# Patient Record
Sex: MALE | Race: OTHER | HISPANIC OR LATINO | Employment: UNEMPLOYED | URBAN - METROPOLITAN AREA
[De-identification: names, ages, dates, MRNs, and addresses within clinical notes are randomized per-mention and may not be internally consistent; named-entity substitution may affect disease eponyms.]

---

## 2023-01-04 ENCOUNTER — HOSPITAL ENCOUNTER (EMERGENCY)
Facility: HOSPITAL | Age: 38
Discharge: NURSING, REHAB OR LONG-TERM CARE | End: 2023-01-06
Attending: EMERGENCY MEDICINE

## 2023-01-04 DIAGNOSIS — K31.89 GASTRIC MASS: Primary | ICD-10-CM

## 2023-01-04 DIAGNOSIS — C78.7 LIVER METASTASIS (HCC): ICD-10-CM

## 2023-01-04 DIAGNOSIS — D64.9 ANEMIA: ICD-10-CM

## 2023-01-04 LAB
ALBUMIN SERPL BCP-MCNC: 3.5 G/DL (ref 3.5–5)
ALP SERPL-CCNC: 496 U/L (ref 34–104)
ALT SERPL W P-5'-P-CCNC: 52 U/L (ref 7–52)
ANION GAP SERPL CALCULATED.3IONS-SCNC: 8 MMOL/L (ref 4–13)
AST SERPL W P-5'-P-CCNC: 58 U/L (ref 13–39)
BASOPHILS # BLD AUTO: 0.05 THOUSANDS/ÂΜL (ref 0–0.1)
BASOPHILS NFR BLD AUTO: 1 % (ref 0–1)
BILIRUB SERPL-MCNC: 0.33 MG/DL (ref 0.2–1)
BILIRUB UR QL STRIP: NEGATIVE
BUN SERPL-MCNC: 15 MG/DL (ref 5–25)
CALCIUM SERPL-MCNC: 8.8 MG/DL (ref 8.4–10.2)
CHLORIDE SERPL-SCNC: 100 MMOL/L (ref 96–108)
CLARITY UR: CLEAR
CO2 SERPL-SCNC: 27 MMOL/L (ref 21–32)
COLOR UR: YELLOW
CREAT SERPL-MCNC: 0.76 MG/DL (ref 0.6–1.3)
EOSINOPHIL # BLD AUTO: 0.23 THOUSAND/ÂΜL (ref 0–0.61)
EOSINOPHIL NFR BLD AUTO: 2 % (ref 0–6)
ERYTHROCYTE [DISTWIDTH] IN BLOOD BY AUTOMATED COUNT: 14.1 % (ref 11.6–15.1)
GFR SERPL CREATININE-BSD FRML MDRD: 114 ML/MIN/1.73SQ M
GLUCOSE SERPL-MCNC: 102 MG/DL (ref 65–140)
GLUCOSE UR STRIP-MCNC: NEGATIVE MG/DL
HCT VFR BLD AUTO: 28 % (ref 36.5–49.3)
HGB BLD-MCNC: 9 G/DL (ref 12–17)
HGB UR QL STRIP.AUTO: NEGATIVE
IMM GRANULOCYTES # BLD AUTO: 0.02 THOUSAND/UL (ref 0–0.2)
IMM GRANULOCYTES NFR BLD AUTO: 0 % (ref 0–2)
KETONES UR STRIP-MCNC: NEGATIVE MG/DL
LEUKOCYTE ESTERASE UR QL STRIP: NEGATIVE
LIPASE SERPL-CCNC: 34 U/L (ref 11–82)
LYMPHOCYTES # BLD AUTO: 2.17 THOUSANDS/ÂΜL (ref 0.6–4.47)
LYMPHOCYTES NFR BLD AUTO: 22 % (ref 14–44)
MCH RBC QN AUTO: 23.6 PG (ref 26.8–34.3)
MCHC RBC AUTO-ENTMCNC: 32.1 G/DL (ref 31.4–37.4)
MCV RBC AUTO: 74 FL (ref 82–98)
MONOCYTES # BLD AUTO: 0.93 THOUSAND/ÂΜL (ref 0.17–1.22)
MONOCYTES NFR BLD AUTO: 9 % (ref 4–12)
NEUTROPHILS # BLD AUTO: 6.51 THOUSANDS/ÂΜL (ref 1.85–7.62)
NEUTS SEG NFR BLD AUTO: 66 % (ref 43–75)
NITRITE UR QL STRIP: NEGATIVE
NRBC BLD AUTO-RTO: 0 /100 WBCS
PH UR STRIP.AUTO: 6 [PH]
PLATELET # BLD AUTO: 490 THOUSANDS/UL (ref 149–390)
PMV BLD AUTO: 9.2 FL (ref 8.9–12.7)
POTASSIUM SERPL-SCNC: 4.3 MMOL/L (ref 3.5–5.3)
PROT SERPL-MCNC: 7.9 G/DL (ref 6.4–8.4)
PROT UR STRIP-MCNC: NEGATIVE MG/DL
RBC # BLD AUTO: 3.81 MILLION/UL (ref 3.88–5.62)
SODIUM SERPL-SCNC: 135 MMOL/L (ref 135–147)
SP GR UR STRIP.AUTO: 1.02 (ref 1–1.03)
UROBILINOGEN UR QL STRIP.AUTO: 1 E.U./DL
WBC # BLD AUTO: 9.91 THOUSAND/UL (ref 4.31–10.16)

## 2023-01-05 ENCOUNTER — APPOINTMENT (EMERGENCY)
Dept: CT IMAGING | Facility: HOSPITAL | Age: 38
End: 2023-01-05

## 2023-01-05 PROBLEM — C79.9 METASTATIC DISEASE (HCC): Status: ACTIVE | Noted: 2023-01-05

## 2023-01-05 PROBLEM — D64.9 ANEMIA: Status: ACTIVE | Noted: 2023-01-05

## 2023-01-05 PROBLEM — K31.89 GASTRIC MASS: Status: ACTIVE | Noted: 2023-01-05

## 2023-01-05 LAB — HEMOCCULT STL QL IA: NEGATIVE

## 2023-01-05 RX ORDER — ONDANSETRON 2 MG/ML
4 INJECTION INTRAMUSCULAR; INTRAVENOUS ONCE
Status: COMPLETED | OUTPATIENT
Start: 2023-01-05 | End: 2023-01-05

## 2023-01-05 RX ORDER — TRAMADOL HYDROCHLORIDE 50 MG/1
50 TABLET ORAL EVERY 6 HOURS PRN
Status: DISCONTINUED | OUTPATIENT
Start: 2023-01-05 | End: 2023-01-06 | Stop reason: HOSPADM

## 2023-01-05 RX ORDER — MORPHINE SULFATE 4 MG/ML
4 INJECTION, SOLUTION INTRAMUSCULAR; INTRAVENOUS ONCE
Status: COMPLETED | OUTPATIENT
Start: 2023-01-05 | End: 2023-01-05

## 2023-01-05 RX ORDER — ACETAMINOPHEN 325 MG/1
975 TABLET ORAL EVERY 6 HOURS PRN
Status: DISCONTINUED | OUTPATIENT
Start: 2023-01-05 | End: 2023-01-06 | Stop reason: HOSPADM

## 2023-01-05 RX ORDER — SODIUM CHLORIDE, SODIUM GLUCONATE, SODIUM ACETATE, POTASSIUM CHLORIDE, MAGNESIUM CHLORIDE, SODIUM PHOSPHATE, DIBASIC, AND POTASSIUM PHOSPHATE .53; .5; .37; .037; .03; .012; .00082 G/100ML; G/100ML; G/100ML; G/100ML; G/100ML; G/100ML; G/100ML
100 INJECTION, SOLUTION INTRAVENOUS CONTINUOUS
Status: DISCONTINUED | OUTPATIENT
Start: 2023-01-05 | End: 2023-01-06 | Stop reason: HOSPADM

## 2023-01-05 RX ORDER — ONDANSETRON 2 MG/ML
4 INJECTION INTRAMUSCULAR; INTRAVENOUS EVERY 6 HOURS PRN
Status: DISCONTINUED | OUTPATIENT
Start: 2023-01-05 | End: 2023-01-06 | Stop reason: HOSPADM

## 2023-01-05 RX ORDER — ENOXAPARIN SODIUM 100 MG/ML
40 INJECTION SUBCUTANEOUS
Status: DISCONTINUED | OUTPATIENT
Start: 2023-01-06 | End: 2023-01-06 | Stop reason: HOSPADM

## 2023-01-05 RX ORDER — MORPHINE SULFATE 4 MG/ML
4 INJECTION, SOLUTION INTRAMUSCULAR; INTRAVENOUS
Status: DISCONTINUED | OUTPATIENT
Start: 2023-01-05 | End: 2023-01-06 | Stop reason: HOSPADM

## 2023-01-05 RX ORDER — MORPHINE SULFATE 4 MG/ML
4 INJECTION, SOLUTION INTRAMUSCULAR; INTRAVENOUS
Status: DISCONTINUED | OUTPATIENT
Start: 2023-01-05 | End: 2023-01-05

## 2023-01-05 RX ORDER — OXYCODONE HYDROCHLORIDE 5 MG/1
5 TABLET ORAL EVERY 6 HOURS PRN
Status: DISCONTINUED | OUTPATIENT
Start: 2023-01-05 | End: 2023-01-06 | Stop reason: HOSPADM

## 2023-01-05 RX ADMIN — ONDANSETRON 4 MG: 2 INJECTION INTRAMUSCULAR; INTRAVENOUS at 23:18

## 2023-01-05 RX ADMIN — IOHEXOL 100 ML: 350 INJECTION, SOLUTION INTRAVENOUS at 02:36

## 2023-01-05 RX ADMIN — ONDANSETRON 4 MG: 2 INJECTION INTRAMUSCULAR; INTRAVENOUS at 01:57

## 2023-01-05 RX ADMIN — MORPHINE SULFATE 4 MG: 4 INJECTION INTRAVENOUS at 02:01

## 2023-01-05 RX ADMIN — SODIUM CHLORIDE, SODIUM GLUCONATE, SODIUM ACETATE, POTASSIUM CHLORIDE, MAGNESIUM CHLORIDE, SODIUM PHOSPHATE, DIBASIC, AND POTASSIUM PHOSPHATE 100 ML/HR: .53; .5; .37; .037; .03; .012; .00082 INJECTION, SOLUTION INTRAVENOUS at 16:41

## 2023-01-05 RX ADMIN — SODIUM CHLORIDE, SODIUM GLUCONATE, SODIUM ACETATE, POTASSIUM CHLORIDE, MAGNESIUM CHLORIDE, SODIUM PHOSPHATE, DIBASIC, AND POTASSIUM PHOSPHATE 100 ML/HR: .53; .5; .37; .037; .03; .012; .00082 INJECTION, SOLUTION INTRAVENOUS at 09:44

## 2023-01-05 RX ADMIN — MORPHINE SULFATE 4 MG: 4 INJECTION INTRAVENOUS at 23:16

## 2023-01-05 RX ADMIN — MORPHINE SULFATE 4 MG: 4 INJECTION INTRAVENOUS at 07:57

## 2023-01-05 NOTE — ED CARE HANDOFF
Emergency Department Sign Out Note        Sign out and transfer of care from Dr Nancy Schwartz  See Separate Emergency Department note  The patient, Les Henning, was evaluated by the previous provider for Abd pain  Workup Completed:  CT scan and bloodwork    ED Course / Workup Pending (followup):  CT withj gastric mass and liver mets  Is anemic  Unsure baseline  Pending heme occult  Heme occult negative  Accepted to [facility]  Likely a significant wait before bed is available  Will order clears diet and IV fluid pending a bed  Signed out to evening physician pending bed availability  ED Course as of 01/05/23 1840   Thu Jan 05, 2023   0656 S/o Abd pain x 10 days  Worse over last 2 days  Nausea  Can't keep food down  No black of bloody stool  Doesn't see doctors  Has gastric mass with hepatic mets     Procedures  MDM        Disposition  Final diagnoses:   Gastric mass   Liver metastasis ([facility] )   Anemia     Time reflects when diagnosis was documented in both MDM as applicable and the Disposition within this note     Time User Action Codes Description Comment    1/5/2023  7:02 AM Samir Bender Add [K31 89] Gastric mass     1/5/2023  7:02 AM Samir Bender Add [C78 7] Liver metastasis ([facility] )     1/5/2023  7:02 AM Samir Bender Add [D64 9] Anemia       ED Disposition     ED Disposition   Transfer to Another Facility-In Network    Condition   --    Date/Time   Th Jan 5, 2023  7:45 AM    Comment   Les Henning should be transferred out to [facility]             MD Documentation    Daryn Abbasi Most Recent Value   Patient Condition The patient has been stabilized such that within reasonable medical probability, no material deterioration of the patient condition or the condition of the unborn child(edvni) is likely to result from the transfer   Reason for Transfer Level of Care needed not available at this facility  [oncology]   Benefits of Transfer Specialized equipment and/or services available at the receiving facility (Include comment)________________________, Continuity of care   Risks of Transfer Potential for delay in receiving treatment, Potential deterioration of medical condition, Loss of IV, Increased discomfort during transfer   Accepting Physician [address] [place] [address] Name, Cynthia Vásquez MD [facility]   Provider Certification General risk, such as traffic hazards, adverse weather conditions, rough terrain or turbulence, possible failure of equipment (including vehicle or aircraft), or consequences of actions of persons outside the control of the transport personnel, Unanticipated needs of medical equipment and personnel during transport, Risk of worsening condition, The possibility of a transport vehicle being unavailable      RN Documentation    [address] Name, Cynthia Viveros      Follow-up Information    None       Patient's Medications    No medications on file     No discharge procedures on file         ED Provider  Electronically Signed by     Xu Montilla DO  01/05/23 0717

## 2023-01-05 NOTE — EMTALA/ACUTE CARE TRANSFER
[facility] EMERGENCY DEPARTMENT  [address] [place] [address] [place] [address]  Dept: [phone]      EMTALA TRANSFER CONSENT    NAME Yumiko Davila                                         1985                              MRN [id number]    I have been informed of my rights regarding examination, treatment, and transfer   by Dr Nneka Olmstead DO    Benefits: Specialized equipment and/or services available at the receiving facility (Include comment)________________________, Continuity of care    Risks: Potential for delay in receiving treatment, Potential deterioration of medical condition, Loss of IV, Increased discomfort during transfer      Consent for Transfer:  I acknowledge that my medical condition has been evaluated and explained to me by the emergency department physician or other qualified medical person and/or my attending physician, who has recommended that I be transferred to the service of  Accepting Physician: Kb Leiva at [address] Name, [address] : [facility]  The above potential benefits of such transfer, the potential risks associated with such transfer, and the probable risks of not being transferred have been explained to me, and I fully understand them  The doctor has explained that, in my case, the benefits of transfer outweigh the risks  I agree to be transferred  I authorize the performance of emergency medical procedures and treatments upon me in both transit and upon arrival at the receiving facility  Additionally, I authorize the release of any and all medical records to the receiving facility and request they be transported with me, if possible  I understand that the safest mode of transportation during a medical emergency is an ambulance and that the Hospital advocates the use of this mode of transport   Risks of traveling to the receiving facility by car, including absence of medical control, life sustaining equipment, such as oxygen, and medical personnel has been explained to me and I fully understand them  (ROSIO CORRECT BOX BELOW)  [  ]  I consent to the stated transfer and to be transported by ambulance/helicopter  [  ]  I consent to the stated transfer, but refuse transportation by ambulance and accept full responsibility for my transportation by car  I understand the risks of non-ambulance transfers and I exonerate the Hospital and its staff from any deterioration in my condition that results from this refusal     X___________________________________________    DATE  23  TIME________  Signature of patient or legally responsible individual signing on patient behalf           RELATIONSHIP TO PATIENT_________________________          Provider Certification    NAME Kraig Stroud                                         1985                              MRN [id number]    A medical screening exam was performed on the above named patient  Based on the examination:    Condition Necessitating Transfer The primary encounter diagnosis was Gastric mass  Diagnoses of Liver metastasis ([facility] ) and Anemia were also pertinent to this visit      Patient Condition: The patient has been stabilized such that within reasonable medical probability, no material deterioration of the patient condition or the condition of the unborn child(edvin) is likely to result from the transfer    Reason for Transfer: Level of Care needed not available at this facility (oncology)    Transfer Requirements: [facility]   · Space available and qualified personnel available for treatment as acknowledged by    · Agreed to accept transfer and to provide appropriate medical treatment as acknowledged by       Kb  · Appropriate medical records of the examination and treatment of the patient are provided at the time of transfer   [address] _______  · Transfer will be performed by qualified personnel from    and appropriate transfer equipment as required, including the use of necessary and appropriate life support measures  Provider Certification: I have examined the patient and explained the following risks and benefits of being transferred/refusing transfer to the patient/family:  General risk, such as traffic hazards, adverse weather conditions, rough terrain or turbulence, possible failure of equipment (including vehicle or aircraft), or consequences of actions of persons outside the control of the transport personnel, Unanticipated needs of medical equipment and personnel during transport, Risk of worsening condition, The possibility of a transport vehicle being unavailable      Based on these reasonable risks and benefits to the patient and/or the unborn child(edvin), and based upon the information available at the time of the patient’s examination, I certify that the medical benefits reasonably to be expected from the provision of appropriate medical treatments at another medical facility outweigh the increasing risks, if any, to the individual’s medical condition, and in the case of labor to the unborn child, from effecting the transfer      X____________________________________________ DATE 01/05/23        TIME_______      ORIGINAL - SEND TO MEDICAL RECORDS   COPY - SEND WITH PATIENT DURING TRANSFER

## 2023-01-05 NOTE — ED NOTES
Tech assisted pt to the bathroom, Pt ambulating  with steady gait       Ariadna Petty RN  01/04/23 6270

## 2023-01-05 NOTE — ED NOTES
Pt ambulating to the bathroom with steady gait  Denies any dizziness       Lawyer Jaylene RN  01/05/23 3748

## 2023-01-06 ENCOUNTER — HOSPITAL ENCOUNTER (INPATIENT)
Facility: HOSPITAL | Age: 38
LOS: 4 days | Discharge: HOME | End: 2023-01-10
Attending: INTERNAL MEDICINE | Admitting: INTERNAL MEDICINE

## 2023-01-06 VITALS
HEIGHT: 66 IN | RESPIRATION RATE: 18 BRPM | BODY MASS INDEX: 28.12 KG/M2 | HEART RATE: 68 BPM | DIASTOLIC BLOOD PRESSURE: 77 MMHG | OXYGEN SATURATION: 97 % | WEIGHT: 175 LBS | TEMPERATURE: 98.4 F | SYSTOLIC BLOOD PRESSURE: 133 MMHG

## 2023-01-06 DIAGNOSIS — D50.9 IRON DEFICIENCY ANEMIA, UNSPECIFIED IRON DEFICIENCY ANEMIA TYPE: ICD-10-CM

## 2023-01-06 DIAGNOSIS — C79.9 METASTATIC DISEASE (HCC): Primary | ICD-10-CM

## 2023-01-06 DIAGNOSIS — K31.89 GASTRIC MASS: ICD-10-CM

## 2023-01-06 PROBLEM — E61.1 IRON DEFICIENCY: Status: ACTIVE | Noted: 2023-01-06

## 2023-01-06 PROBLEM — R11.2 NAUSEA AND VOMITING: Status: ACTIVE | Noted: 2023-01-06

## 2023-01-06 LAB
ABO GROUP BLD: NORMAL
ABO GROUP BLD: NORMAL
ALBUMIN SERPL BCP-MCNC: 3.1 G/DL (ref 3.5–5)
ALP SERPL-CCNC: 405 U/L (ref 34–104)
ALT SERPL W P-5'-P-CCNC: 40 U/L (ref 7–52)
ANION GAP SERPL CALCULATED.3IONS-SCNC: 7 MMOL/L (ref 4–13)
AST SERPL W P-5'-P-CCNC: 48 U/L (ref 13–39)
BILIRUB SERPL-MCNC: 0.46 MG/DL (ref 0.2–1)
BLD GP AB SCN SERPL QL: NEGATIVE
BUN SERPL-MCNC: 12 MG/DL (ref 5–25)
CALCIUM ALBUM COR SERPL-MCNC: 9.5 MG/DL (ref 8.3–10.1)
CALCIUM SERPL-MCNC: 8.8 MG/DL (ref 8.4–10.2)
CHLORIDE SERPL-SCNC: 101 MMOL/L (ref 96–108)
CO2 SERPL-SCNC: 28 MMOL/L (ref 21–32)
CREAT SERPL-MCNC: 0.71 MG/DL (ref 0.6–1.3)
ERYTHROCYTE [DISTWIDTH] IN BLOOD BY AUTOMATED COUNT: 14.1 % (ref 11.6–15.1)
FERRITIN SERPL-MCNC: 140 NG/ML (ref 8–388)
GFR SERPL CREATININE-BSD FRML MDRD: 118 ML/MIN/1.73SQ M
GLUCOSE SERPL-MCNC: 88 MG/DL (ref 65–140)
HCT VFR BLD AUTO: 24.6 % (ref 36.5–49.3)
HGB BLD-MCNC: 7.9 G/DL (ref 12–17)
HIV 1+2 AB+HIV1 P24 AG SERPL QL IA: NORMAL
HIV1 P24 AG SER QL: NORMAL
IRON SATN MFR SERPL: 5 % (ref 20–50)
IRON SERPL-MCNC: 20 UG/DL (ref 65–175)
MAGNESIUM SERPL-MCNC: 2.2 MG/DL (ref 1.9–2.7)
MCH RBC QN AUTO: 23.6 PG (ref 26.8–34.3)
MCHC RBC AUTO-ENTMCNC: 32.1 G/DL (ref 31.4–37.4)
MCV RBC AUTO: 73 FL (ref 82–98)
PLATELET # BLD AUTO: 407 THOUSANDS/UL (ref 149–390)
PMV BLD AUTO: 9.2 FL (ref 8.9–12.7)
POTASSIUM SERPL-SCNC: 4.2 MMOL/L (ref 3.5–5.3)
PROT SERPL-MCNC: 6.8 G/DL (ref 6.4–8.4)
RBC # BLD AUTO: 3.35 MILLION/UL (ref 3.88–5.62)
RH BLD: POSITIVE
RH BLD: POSITIVE
SODIUM SERPL-SCNC: 136 MMOL/L (ref 135–147)
SPECIMEN EXPIRATION DATE: NORMAL
TIBC SERPL-MCNC: 441 UG/DL (ref 250–450)
WBC # BLD AUTO: 8.34 THOUSAND/UL (ref 4.31–10.16)

## 2023-01-06 RX ORDER — TRAMADOL HYDROCHLORIDE 50 MG/1
50 TABLET ORAL EVERY 6 HOURS PRN
Status: DISCONTINUED | OUTPATIENT
Start: 2023-01-06 | End: 2023-01-10 | Stop reason: HOSPADM

## 2023-01-06 RX ORDER — SODIUM CHLORIDE, SODIUM GLUCONATE, SODIUM ACETATE, POTASSIUM CHLORIDE, MAGNESIUM CHLORIDE, SODIUM PHOSPHATE, DIBASIC, AND POTASSIUM PHOSPHATE .53; .5; .37; .037; .03; .012; .00082 G/100ML; G/100ML; G/100ML; G/100ML; G/100ML; G/100ML; G/100ML
100 INJECTION, SOLUTION INTRAVENOUS CONTINUOUS
Status: DISCONTINUED | OUTPATIENT
Start: 2023-01-06 | End: 2023-01-07

## 2023-01-06 RX ORDER — MORPHINE SULFATE 4 MG/ML
4 INJECTION, SOLUTION INTRAMUSCULAR; INTRAVENOUS
Status: DISCONTINUED | OUTPATIENT
Start: 2023-01-06 | End: 2023-01-10 | Stop reason: HOSPADM

## 2023-01-06 RX ORDER — OXYCODONE HYDROCHLORIDE 5 MG/1
5 TABLET ORAL EVERY 6 HOURS PRN
Status: DISCONTINUED | OUTPATIENT
Start: 2023-01-06 | End: 2023-01-10 | Stop reason: HOSPADM

## 2023-01-06 RX ORDER — PANTOPRAZOLE SODIUM 40 MG/10ML
40 INJECTION, POWDER, LYOPHILIZED, FOR SOLUTION INTRAVENOUS
Status: DISCONTINUED | OUTPATIENT
Start: 2023-01-06 | End: 2023-01-09

## 2023-01-06 RX ORDER — ONDANSETRON 2 MG/ML
4 INJECTION INTRAMUSCULAR; INTRAVENOUS EVERY 6 HOURS PRN
Status: DISCONTINUED | OUTPATIENT
Start: 2023-01-06 | End: 2023-01-10 | Stop reason: HOSPADM

## 2023-01-06 RX ORDER — ENOXAPARIN SODIUM 100 MG/ML
40 INJECTION SUBCUTANEOUS DAILY
Status: DISCONTINUED | OUTPATIENT
Start: 2023-01-07 | End: 2023-01-06

## 2023-01-06 RX ORDER — ACETAMINOPHEN 325 MG/1
975 TABLET ORAL EVERY 6 HOURS PRN
Status: DISCONTINUED | OUTPATIENT
Start: 2023-01-06 | End: 2023-01-08

## 2023-01-06 RX ADMIN — SODIUM CHLORIDE, SODIUM GLUCONATE, SODIUM ACETATE, POTASSIUM CHLORIDE, MAGNESIUM CHLORIDE, SODIUM PHOSPHATE, DIBASIC, AND POTASSIUM PHOSPHATE 100 ML/HR: .53; .5; .37; .037; .03; .012; .00082 INJECTION, SOLUTION INTRAVENOUS at 06:47

## 2023-01-06 RX ADMIN — PANTOPRAZOLE SODIUM 40 MG: 40 INJECTION, POWDER, FOR SOLUTION INTRAVENOUS at 21:48

## 2023-01-06 RX ADMIN — TRAMADOL HYDROCHLORIDE 50 MG: 50 TABLET, COATED ORAL at 06:50

## 2023-01-06 RX ADMIN — ENOXAPARIN SODIUM 40 MG: 40 INJECTION SUBCUTANEOUS at 09:53

## 2023-01-06 RX ADMIN — ACETAMINOPHEN 975 MG: 325 TABLET, FILM COATED ORAL at 14:46

## 2023-01-06 RX ADMIN — ONDANSETRON 4 MG: 2 INJECTION INTRAMUSCULAR; INTRAVENOUS at 21:21

## 2023-01-06 RX ADMIN — OXYCODONE HYDROCHLORIDE 5 MG: 5 TABLET ORAL at 14:46

## 2023-01-06 RX ADMIN — IRON SUCROSE 200 MG: 20 INJECTION, SOLUTION INTRAVENOUS at 21:28

## 2023-01-06 RX ADMIN — SODIUM CHLORIDE, SODIUM GLUCONATE, SODIUM ACETATE, POTASSIUM CHLORIDE, MAGNESIUM CHLORIDE, SODIUM PHOSPHATE, DIBASIC, AND POTASSIUM PHOSPHATE 100 ML/HR: .53; .5; .37; .037; .03; .012; .00082 INJECTION, SOLUTION INTRAVENOUS at 20:47

## 2023-01-06 NOTE — CONSULTS
[address] 1985, 42 y o  male MRN: [id number]  Unit/Bed#: ED 13 Encounter: [id number]  Primary Care Provider: No primary care provider on file  Date and time admitted to hospital: 1/4/2023 11:10 PM    [employer] Translation services utilized during exam    Consult to internal medicine  Consult performed by: ABBY Mar  Consult ordered by: Antoinette Hoang MD        * Gastric mass  Assessment & Plan  · Patient presented to ED with abdominal pain x 1 5 weeks  · Reports pain is worse in his epigastric and RUQ with generalized pain across his entire abdomen   · CT C/A/P - gastric mass concern for gastric adenocarcinoma with hepatic, ventral epigastric omental and lesser metastatic disease, multiple intrapulmonary lymph nodes noted   · PRN pain and nausea medication  · Pending transfer to [facility] for Heme/Onc evaluation and work up    [facility]  Assessment & Plan  · Noted on CT C/A/P - Hepatic, ventral epigastric omental and lesser sac metastatic disease  · Elevated AST, alk phos, and platelets noted in ED  · Continue to trend daily  · Pending transfer to [facility] for Heme/Onc    Anemia  Assessment & Plan  · Hgb 9 in ED  · Patient with no known medical history and has not seen a doctor recently  · Concern for iron deficiency vs slow gastric bleed from gastric cancer with mets  · Iron panel pending  · FOBT negative  · Monitor hemoglobin daily       VTE Prophylaxis: VTE Score: 3 Moderate Risk (Score 3-4) - Pharmacological DVT Prophylaxis Ordered: enoxaparin (Lovenox)  Recommendations for Discharge:  · Pending Heme/Onc recommendations    Counseling / Coordination of Care Time: 30 minutes Greater than 50% of total time spent on patient counseling and coordination of care  Collaboration of Care: Were Recommendations Directly Discussed with Primary Treatment Team? No    History of Present Illness:  Brandon Shea is a 42 y o  male who is originally admitted to the [place] [address] service due to new gastric mass with metastatic disease  We are consulted for medical management pending transfer to new facility  Patient presented to the ED with right sided abdominal pain for the past week and a half  Patient denies any prior nausea or vomiting or OTC medication use  In the ED, patient was noted to have a CT that revealed a mass in his stomach with concern of metastatic disease to various systems including his liver  Patient was given Morphine with relief of his abdominal pain but now endorses some nausea  Patient's laboratory evaluation in the ED was also remarkable for anemia and thrombocytosis  Review of Systems:  Review of Systems   Constitutional: Negative for chills, diaphoresis, fatigue and fever  HENT: Negative for congestion, sore throat and trouble swallowing  Respiratory: Negative for cough and shortness of breath  Cardiovascular: Negative for chest pain and leg swelling  Gastrointestinal: Positive for abdominal pain, nausea and vomiting  Genitourinary: Negative for difficulty urinating, frequency and urgency  Musculoskeletal: Negative for arthralgias and myalgias  Neurological: Positive for headaches  Negative for dizziness, light-headedness and numbness  Past Medical and Surgical History:   History reviewed  No pertinent past medical history  History reviewed  No pertinent surgical history  Meds/Allergies:  all medications and allergies reviewed    Allergies: No Known Allergies    Social History:  Marital Status: Single  Substance Use History:   Social History     Substance and Sexual Activity   Alcohol Use None     Social History     Tobacco Use   Smoking Status Never   Smokeless Tobacco Never     Social History     Substance and Sexual Activity   Drug Use Not on file       Family History:  History reviewed  No pertinent family history      Physical Exam:   Vitals:   Blood Pressure: 98/56 (01/05/23 1846)  Pulse: 56 (01/05/23 1846)  Temperature: 98 7 °F (37 1 °C) (01/05/23 0609)  Temp Source: Oral (01/05/23 0609)  Respirations: 16 (01/05/23 1846)  Height: 5' 6" (167 6 cm) (01/05/23 0210)  Weight - Scale: 79 4 kg (175 lb) (01/04/23 2318)  SpO2: 99 % (01/05/23 1846)    Physical Exam  Vitals reviewed  Constitutional:       General: He is not in acute distress  Appearance: Normal appearance  HENT:      Head: Normocephalic  Nose: Nose normal       Mouth/Throat:      Mouth: Mucous membranes are moist    Eyes:      Conjunctiva/sclera: Conjunctivae normal       Pupils: Pupils are equal, round, and reactive to light  Cardiovascular:      Rate and Rhythm: Normal rate and regular rhythm  Pulses: Normal pulses  Heart sounds: Normal heart sounds  Pulmonary:      Effort: Pulmonary effort is normal  No respiratory distress  Breath sounds: Normal breath sounds  No wheezing or rhonchi  Abdominal:      General: Bowel sounds are normal       Palpations: Abdomen is soft  Tenderness: There is abdominal tenderness in the right upper quadrant, epigastric area and periumbilical area  There is no guarding or rebound  Musculoskeletal:         General: Normal range of motion  Cervical back: Normal range of motion and neck supple  Right lower leg: No edema  Left lower leg: No edema  Skin:     General: Skin is warm and dry  Capillary Refill: Capillary refill takes less than 2 seconds  Coloration: Skin is not jaundiced or pale  Neurological:      General: No focal deficit present  Mental Status: He is alert and oriented to person, place, and time     Psychiatric:         Mood and Affect: Mood normal          Behavior: Behavior normal           Additional Data:   Lab Results:    Results from last 7 days   Lab Units 01/04/23  2328   WBC Thousand/uL 9 91   HEMOGLOBIN g/dL 9 0*   HEMATOCRIT % 28 0*   PLATELETS Thousands/uL 490*   NEUTROS PCT % 66   LYMPHS PCT % 22   MONOS PCT % 9   EOS PCT % 2     Results from last 7 days   Lab Units 01/04/23  2328   SODIUM mmol/L 135   POTASSIUM mmol/L 4 3   CHLORIDE mmol/L 100   CO2 mmol/L 27   BUN mg/dL 15   CREATININE mg/dL 0 76   ANION GAP mmol/L 8   CALCIUM mg/dL 8 8   ALBUMIN g/dL 3 5   TOTAL BILIRUBIN mg/dL 0 33   ALK PHOS U/L 496*   ALT U/L 52   AST U/L 58*   GLUCOSE RANDOM mg/dL 102             No results found for: HGBA1C            Imaging: Reviewed radiology reports from this admission including: chest CT scan and abdominal/pelvic CT  CT chest abdomen pelvis w contrast   Final Result by Herbie De La Cruz MD (01/05 3856)      CT chest:      Multiple pulmonary nodules, several of which appear to be intrapulmonary lymph nodes, 7 mm and smaller with unknown long-term stability  Given findings in the abdomen, CT chest follow-up in 3 months is advised  Minimal bilateral lower lobe subsegmental atelectasis  CT abdomen and pelvis:      6 4 x 4 8 cm mass centered in the gastric antrum attributed to malignancy  This may represent gastric adenocarcinoma, GIST, or lymphoma among other etiologies  Hepatic, ventral epigastric omental and lesser sac metastatic disease  This study demonstrates a significant  finding and was documented as such in [employer] for liaison and referring practitioner notification  This study demonstrates a finding requiring imaging follow-up and was documented as such in Epic  Workstation performed: [id number]             EKG, Pathology, and Other Studies Reviewed on Admission:   · EKG: No EKG obtained  ** Please Note: This note may have been constructed using a voice recognition system   **

## 2023-01-06 NOTE — ED NOTES
Utilizing the language line for interpretation, spoke with the pt regarding current disposition  Pt is still waiting for bed placement at [facility] and pt indicated that he understood why he was being transferred  Pt c/o mild pain, "5"  Pt willing to take medication at this time  Personal belongings placed in a bag and the room rearranged for comfort  Pt denied current needs at this time        Rachel Bland RN  01/06/23 4565

## 2023-01-06 NOTE — ASSESSMENT & PLAN NOTE
· Hgb 9 in ED  · Patient with no known medical history and has not seen a doctor recently  · Concern for iron deficiency vs slow gastric bleed from gastric cancer with mets  · Iron panel pending  · FOBT negative  · Monitor hemoglobin daily

## 2023-01-06 NOTE — ED NOTES
[facility] indicated that they do not have a bed or time for the pt to be transferred at this point     Jordin Dorman RN  01/06/23 1111

## 2023-01-06 NOTE — ED NOTES
Spoke with laboratory staff -- confirmed new collection for iron panel specimen not required, previously received specimen already ready to be sent to [facility] as ordered       Froylan Mazariegos RN  01/05/23 2014

## 2023-01-06 NOTE — ASSESSMENT & PLAN NOTE
· Patient presented to ED with abdominal pain x 1 5 weeks  · Reports pain is worse in his epigastric and RUQ with generalized pain across his entire abdomen   · CT C/A/P - gastric mass concern for gastric adenocarcinoma with hepatic, ventral epigastric omental and lesser metastatic disease, multiple intrapulmonary lymph nodes noted   · PRN pain and nausea medication  · Pending transfer to [facility] for Heme/Onc evaluation and work up

## 2023-01-06 NOTE — ASSESSMENT & PLAN NOTE
· Noted on CT C/A/P - Hepatic, ventral epigastric omental and lesser sac metastatic disease    · Elevated AST, alk phos, and platelets noted in ED  · Continue to trend daily  · Pending transfer to [facility] for Heme/Onc

## 2023-01-07 LAB
ALBUMIN SERPL BCP-MCNC: 2.5 G/DL (ref 3.5–5)
ALP SERPL-CCNC: 430 U/L (ref 46–116)
ALT SERPL W P-5'-P-CCNC: 43 U/L (ref 12–78)
ANION GAP SERPL CALCULATED.3IONS-SCNC: 8 MMOL/L (ref 4–13)
AST SERPL W P-5'-P-CCNC: 129 U/L (ref 5–45)
BASOPHILS # BLD AUTO: 0.04 THOUSANDS/ÂΜL (ref 0–0.1)
BASOPHILS NFR BLD AUTO: 1 % (ref 0–1)
BILIRUB DIRECT SERPL-MCNC: 0.11 MG/DL (ref 0–0.2)
BILIRUB SERPL-MCNC: 0.41 MG/DL (ref 0.2–1)
BUN SERPL-MCNC: 14 MG/DL (ref 5–25)
CALCIUM SERPL-MCNC: 8.7 MG/DL (ref 8.3–10.1)
CHLORIDE SERPL-SCNC: 104 MMOL/L (ref 96–108)
CO2 SERPL-SCNC: 26 MMOL/L (ref 21–32)
CREAT SERPL-MCNC: 0.8 MG/DL (ref 0.6–1.3)
EOSINOPHIL # BLD AUTO: 0.1 THOUSAND/ÂΜL (ref 0–0.61)
EOSINOPHIL NFR BLD AUTO: 1 % (ref 0–6)
ERYTHROCYTE [DISTWIDTH] IN BLOOD BY AUTOMATED COUNT: 14.1 % (ref 11.6–15.1)
GFR SERPL CREATININE-BSD FRML MDRD: 112 ML/MIN/1.73SQ M
GLUCOSE SERPL-MCNC: 84 MG/DL (ref 65–140)
HBV CORE AB SER QL: NORMAL
HBV CORE IGM SER QL: NORMAL
HBV SURFACE AG SER QL: NORMAL
HCT VFR BLD AUTO: 26.2 % (ref 36.5–49.3)
HCV AB SER QL: NORMAL
HGB BLD-MCNC: 8.2 G/DL (ref 12–17)
IMM GRANULOCYTES # BLD AUTO: 0.02 THOUSAND/UL (ref 0–0.2)
IMM GRANULOCYTES NFR BLD AUTO: 0 % (ref 0–2)
LYMPHOCYTES # BLD AUTO: 1 THOUSANDS/ÂΜL (ref 0.6–4.47)
LYMPHOCYTES NFR BLD AUTO: 13 % (ref 14–44)
MCH RBC QN AUTO: 23.4 PG (ref 26.8–34.3)
MCHC RBC AUTO-ENTMCNC: 31.3 G/DL (ref 31.4–37.4)
MCV RBC AUTO: 75 FL (ref 82–98)
MONOCYTES # BLD AUTO: 0.7 THOUSAND/ÂΜL (ref 0.17–1.22)
MONOCYTES NFR BLD AUTO: 9 % (ref 4–12)
NEUTROPHILS # BLD AUTO: 5.77 THOUSANDS/ÂΜL (ref 1.85–7.62)
NEUTS SEG NFR BLD AUTO: 76 % (ref 43–75)
NRBC BLD AUTO-RTO: 0 /100 WBCS
PLATELET # BLD AUTO: 456 THOUSANDS/UL (ref 149–390)
PMV BLD AUTO: 8.9 FL (ref 8.9–12.7)
POTASSIUM SERPL-SCNC: 3.9 MMOL/L (ref 3.5–5.3)
PROT SERPL-MCNC: 7.2 G/DL (ref 6.4–8.4)
RBC # BLD AUTO: 3.5 MILLION/UL (ref 3.88–5.62)
SODIUM SERPL-SCNC: 138 MMOL/L (ref 135–147)
WBC # BLD AUTO: 7.63 THOUSAND/UL (ref 4.31–10.16)

## 2023-01-07 RX ORDER — POLYETHYLENE GLYCOL 3350 17 G/17G
17 POWDER, FOR SOLUTION ORAL DAILY
Status: DISCONTINUED | OUTPATIENT
Start: 2023-01-07 | End: 2023-01-10 | Stop reason: HOSPADM

## 2023-01-07 RX ORDER — ENOXAPARIN SODIUM 100 MG/ML
40 INJECTION SUBCUTANEOUS
Status: DISCONTINUED | OUTPATIENT
Start: 2023-01-07 | End: 2023-01-10 | Stop reason: HOSPADM

## 2023-01-07 RX ADMIN — IRON SUCROSE 200 MG: 20 INJECTION, SOLUTION INTRAVENOUS at 08:49

## 2023-01-07 RX ADMIN — PANTOPRAZOLE SODIUM 40 MG: 40 INJECTION, POWDER, FOR SOLUTION INTRAVENOUS at 08:52

## 2023-01-07 RX ADMIN — SODIUM CHLORIDE, SODIUM GLUCONATE, SODIUM ACETATE, POTASSIUM CHLORIDE, MAGNESIUM CHLORIDE, SODIUM PHOSPHATE, DIBASIC, AND POTASSIUM PHOSPHATE 100 ML/HR: .53; .5; .37; .037; .03; .012; .00082 INJECTION, SOLUTION INTRAVENOUS at 08:49

## 2023-01-07 NOTE — ASSESSMENT & PLAN NOTE
Patient presenting with 2 wk hx of RUQ post-prandial pain found to have gastric antral mass as well as potential mets to liver, omentum, and lung    · Consult GI for EGD biopsy, appreciate recommendations  · Plan for EGD on Monday 1/9  · Recommend IR consult for liver biopsy   · IR consulted for biopsy of liver lesion, pending reccs  · Consulted heme onc, pending reccs   · Hepatitis panel and HIV negative  · On IV Protonix 40 mg over concern for GI bleeding  · Continue DVT ppx for now, but low threshold to stop if acute Hgb drop or any episode of hematemesis  · Tylenol 975 mg q6 PRN for mild pain, Tramadol 50 mg q6 PRN for moderate pain, Oxy 5 mg q6 PRN for severe pain, and Morphine 4mg q3 PRN for breakthrough pain  · Zofran and Tigan PRN for nausea   · Clear liquid diet continue as tolerated with ensures

## 2023-01-07 NOTE — PLAN OF CARE
Problem: PAIN - ADULT  Goal: Verbalizes/displays adequate comfort level or baseline comfort level  Description: Interventions:  - Encourage patient to monitor pain and request assistance  - Assess pain using appropriate pain scale  - Administer analgesics based on type and severity of pain and evaluate response  - Implement non-pharmacological measures as appropriate and evaluate response  - Consider cultural and social influences on pain and pain management  - Notify physician/advanced practitioner if interventions unsuccessful or patient reports new pain  Outcome: Progressing     Problem: INFECTION - ADULT  Goal: Absence or prevention of progression during hospitalization  Description: INTERVENTIONS:  - Assess and monitor for signs and symptoms of infection  - Monitor lab/diagnostic results  - Monitor all insertion sites, i e  indwelling lines, tubes, and drains  - Monitor endotracheal if appropriate and nasal secretions for changes in amount and color  - [place] appropriate cooling/warming therapies per order  - Administer medications as ordered  - Instruct and encourage patient and family to use good hand hygiene technique  - Identify and instruct in appropriate isolation precautions for identified infection/condition  Outcome: Progressing     Problem: DISCHARGE PLANNING  Goal: Discharge to home or other facility with appropriate resources  Description: INTERVENTIONS:  - Identify barriers to discharge w/patient and caregiver  - Arrange for needed discharge resources and transportation as appropriate  - Identify discharge learning needs (meds, wound care, etc )  - Arrange for interpretive services to assist at discharge as needed  - Refer to Case Management Department for coordinating discharge planning if the patient needs post-hospital services based on physician/advanced practitioner order or complex needs related to functional status, cognitive ability, or social support system  Outcome: Progressing Problem: Knowledge Deficit  Goal: Patient/family/caregiver demonstrates understanding of disease process, treatment plan, medications, and discharge instructions  Description: Complete learning assessment and assess knowledge base    Interventions:  - Provide teaching at level of understanding  - Provide teaching via preferred learning methods  Outcome: Progressing

## 2023-01-07 NOTE — ASSESSMENT & PLAN NOTE
Patient presenting with Hg 9 0 and MCV 74; Iron 20, TIBC 441, and Iron Sat 5  Likely iron-deficiency anemia in setting of GI blood loss 2/2 gastric mass    · Hb improving  · patient reported an episode of coffee-ground emesis previously   No longer having episodes currently  · Continue Venofer 200 mg daily  · On IV Protonix 40 mg daily over concern for GI bleed   · Gastroenterology consulted for potential EGD, appreciate recommendations  · Plan for EGD on Monday 1/9  · Trend CBC  · Transfuse if Hgb <7

## 2023-01-07 NOTE — CONSULTS
Consult Service: Gastroenterology      PATIENT INFORMATION      Gardenia Dakins 42 y o  male MRN: [id number]  Unit/Bed#: [facility] 919-01 Encounter: [id number]  PCP: No primary care provider on file  Date of Admission:  1/6/2023  Date of Consultation: 01/07/23  Requesting Physician: Jolie Anderson, DO       ASSESSMENTS & PLAN   Gardenia Dakins is a 42 y o  old male with no significant past medical history presenting with abdominal pain, with GI consulted for NIRAV, gastric mass and liver mets noted on imaging  Gastric mass  - DDx includes gastric adenocarcinoma, GIST, lymphoma  - Concerning for Stage IV metastatic disease given pulmonary nodules, hepatic, ventral epigastric omental and lesser sac findings on imaging  - Will need an EGD with biopsy of mass +/- possible EUS during admission, will plan for Monday  - Recommend IR consult for biopsy of liver mass  - Please check daily CBC  - Recommend oncology consult    Iron deficiency anemia  - Most likely in the setting of malignancy as above  - DDx would also include peptic ulcer disease, gastritis, AVM  - Recommend iron supplementation  - EGD as above      REASON FOR CONSULTATION      Gastric mass    HISTORY OF PRESENT ILLNESS      Gardenia Dakins is a 42 y o  male with no significant past medical history presenting with abdominal pain, with GI consulted for NIRAV, gastric mass and liver mets noted on imaging  Interview conducted via INXPO  ID [id number]  Presented to [address] [place] [address],[address] on 1/4 with RUQ abdominal pain for 15 days, worse with movement and food  Associated with nausea, one reported episode of coffee ground emesis  Also with weight loss over the last few months  On arrival to ED, CT A/P obtained showing gastric antral mass 6 4 cm x 4 8 cm, hypodense liver lesions, multiple pulmonary nodules, omental nodules  Labs notable for anemia to 7 9, microcytic, with iron deficiency noted      Patient denies any family history of gastric cancer, any personal history of H  pylori, no EGD in the past   Denies any emesis in the last 2 days  REVIEW OF SYSTEMS     A thorough 12-point review of systems has been conducted  Pertinent positives and negatives are mentioned in the history of present illness  PAST MEDICAL & SURGICAL HISTORY      No past medical history on file  No past surgical history on file        MEDICATIONS & ALLERGIES       Medications:   Prior to Admission medications    Not on File       Allergies: No Known Allergies      SOCIAL HISTORY      Marital Status: Single    Substance Use History:   Social History     Substance and Sexual Activity   Alcohol Use Never     Social History     Tobacco Use   Smoking Status Never   Smokeless Tobacco Never     Social History     Substance and Sexual Activity   Drug Use Never         FAMILY HISTORY      Non-Contributory      PHYSICAL EXAM     Vitals:   Blood Pressure: 128/75 (01/06/23 2209)  Pulse: 61 (01/06/23 2209)  Temperature: 97 9 °F (36 6 °C) (01/06/23 2209)  Respirations: 16 (01/06/23 2209)  Height: 5' 6" (167 6 cm) (01/07/23 0100)  Weight - Scale: 79 4 kg (175 lb) (01/07/23 0100)  SpO2: 96 % (01/06/23 2209)    Physical Exam:   GENERAL: NAD  HEENT:  NC/AT, no scleral icterus  CARDIAC:  RRR, +S1/S2  PULMONARY:  CTA B/L, no wheezing/rales/rhonci, non-labored breathing  ABDOMEN:  Soft, NT/ND, +BS, no rebound/guarding/rigidity  EXTREMITIES:  No edema, cyanosis, or clubbing  NEUROLOGIC:  Alert  SKIN:  No rashes or erythema    ADDITIONAL DATA     Lab Results:       Lab Units 01/06/23 0512 01/04/23  2328   SODIUM mmol/L 136 135   POTASSIUM mmol/L 4 2 4 3   CHLORIDE mmol/L 101 100   CO2 mmol/L 28 27   BUN mg/dL 12 15   CREATININE mg/dL 0 71 0 76   GLUCOSE RANDOM mg/dL 88 102   CALCIUM mg/dL 8 8 8 8   MAGNESIUM mg/dL 2 2  --             Lab Units 01/06/23  0512 01/04/23  2328   TOTAL PROTEIN g/dL 6 8 7 9   ALBUMIN g/dL 3 1* 3 5   TOTAL BILIRUBIN mg/dL 0 46 0 33   AST U/L 48* 58* ALT U/L 40 52   ALK PHOS U/L 405* 496*           Lab Units 01/06/23  0512 01/04/23  2328   WBC Thousand/uL 8 34 9 91   HEMOGLOBIN g/dL 7 9* 9 0*   HEMATOCRIT % 24 6* 28 0*   PLATELETS Thousands/uL 407* 490*   MCV fL 73* 74*       Lab Results   Component Value Date    IRON 20 (L) 01/04/2023    TIBC 441 01/04/2023    FERRITIN 140 01/04/2023       No results found for: INR, PROTIME      Microbiology Results:        Imaging:  Procedure: CT chest abdomen pelvis w contrast    Result Date: 1/5/2023  Narrative: CT CHEST, ABDOMEN AND PELVIS WITH IV CONTRAST INDICATION:   RLQ abdominal pain (Age >= 14y) abdominal pain  COMPARISON:  None  TECHNIQUE: CT examination of the chest, abdomen and pelvis was performed  In addition to portal venous phase postcontrast scanning through the abdomen and pelvis, delayed phase postcontrast scanning was performed through the upper abdominal viscera  Axial, sagittal, and coronal 2D reformatted images were created from the source data and submitted for interpretation  Radiation dose length product (DLP) for this visit:  1294 1 mGy-cm   This examination, like all CT scans performed in the [facility], was performed utilizing techniques to minimize radiation dose exposure, including the use of iterative  reconstruction and automated exposure control  IV Contrast:  100 mL of iohexol (OMNIPAQUE)   350 Enteric Contrast: Enteric contrast was administered  FINDINGS: CHEST LUNGS:  Minimal bilateral lower lobe subsegmental atelectasis  Multiple juxtapleural and/or discoid noncalcified pulmonary nodules several of which likely represent intrapulmonary lymph nodes; representative nodules will be measured/marked on series 205 Image 44, right upper lobe, 3 mm  Image 89, right lower lobe, 6 mm  Image 90, right middle lobe, 7 mm  Central airways are clear  PLEURA:  Unremarkable  HEART/GREAT VESSELS: Heart is unremarkable for patient's age  No thoracic aortic aneurysm   MEDIASTINUM AND NANCY:  Unremarkable  CHEST WALL AND LOWER NECK:  Unremarkable  ABDOMEN LIVER/BILIARY TREE:  Multiple hypodense lesions scattered throughout the liver attributed to metastatic disease until proven otherwise  Representative lesions will be measured on series 201: Image 12, dome of the liver, 7 2 x 6 1 cm  Image 17, segment 7, 7 8 x 7 6 cm  Image 36, segment 4B, 7 x 4 6 cm  No biliary dilation  GALLBLADDER:  No calcified gallstones  No pericholecystic inflammatory change  SPLEEN:  Unremarkable  PANCREAS:  Unremarkable  ADRENAL GLANDS:  Unremarkable  KIDNEYS/URETERS:  Unremarkable  No hydronephrosis  STOMACH AND BOWEL:  Lobulated hypodense mass centered at the gastric antrum measures 6 4 x 4 8 cm image 37 series 201  There appears to be intraluminal spread into the mid gastric body  No bowel obstruction  Enteric contrast has passed into the small bowel  Moderate stool in the colon  APPENDIX:  No findings to suggest appendicitis  ABDOMINOPELVIC CAVITY:  Ventral epigastric omental and lesser sac soft tissue nodules; representative nodules will be measured/marked on series 201: Image 37, left ventral paramedian omentum, 2 x 1 6 cm  Image 37, left paramedian lesser sac, 1 7 x 1 2 cm  No ascites  No pneumoperitoneum  VESSELS:  Mild attenuation of the distal posterior divisions of the right intrahepatic portal vein  Otherwise unremarkable  PELVIS REPRODUCTIVE ORGANS:  Unremarkable for patient's age  URINARY BLADDER:  Unremarkable  ABDOMINAL WALL/INGUINAL REGIONS:  Tiny fat-containing umbilical hernia  OSSEOUS STRUCTURES:  No acute fracture or destructive osseous lesion  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  Impression: CT chest: Multiple pulmonary nodules, several of which appear to be intrapulmonary lymph nodes, 7 mm and smaller with unknown long-term stability  Given findings in the abdomen, CT chest follow-up in 3 months is advised   Minimal bilateral lower lobe subsegmental atelectasis  CT abdomen and pelvis: 6 4 x 4 8 cm mass centered in the gastric antrum attributed to malignancy  This may represent gastric adenocarcinoma, GIST, or lymphoma among other etiologies  Hepatic, ventral epigastric omental and lesser sac metastatic disease  This study demonstrates a significant  finding and was documented as such in [facility] for liaison and referring practitioner notification  This study demonstrates a finding requiring imaging follow-up and was documented as such in Epic  Workstation performed: [id number]     Narrative/Impressions - 3 day look back     EKG, Pathology, and Other Studies Reviewed on Admission:   · EKG: Reviewed    Counseling / Coordination of Care Time: 30 total mins spent n consult  Greater than 50% of total time spent on patient counseling and coordination of care    CHARLOTTE Tam   PGY-4 Gastroenterology Fellow  [facility]  Available on [email or web address]  Recommendations not final until attending attestation

## 2023-01-07 NOTE — PROGRESS NOTES
INTERNAL MEDICINE RESIDENCY SENIOR ADMISSION NOTE     Name: Brandon Shea   Age & Sex: 42 y o  male   MRN: [id number]  Unit/Bed#: [facility] 919-01   Encounter: [id number]  Primary Care Provider: No primary care provider on file  Admit to team: SOD Team A    Patient seen and examined  Reviewed H&P per Dr Dayana Mock   Agree with the assessment and plan with any exception/addition as noted below:    Principal Problem:    Gastric mass  Active Problems:    Metastatic disease (HCC)    Iron deficiency anemia    Iron deficiency    Nausea and vomiting    43 yo M with no known medical history who is visiting from [facility], initially presented with RUQ/epigastric abdominal pain that started about 2 weeks prior  Transferred to [facility] for tissue diagnosis of newly discovered gastric body mass  Patient has family in [place] (brother and unspecified other relatives) who are also all non-English speaking  Patient states he initially noticed 2 weeks ago that he was having some gastric irritation with spicy foods, but it wasn't really limiting his ability to eat  He does however state that he has been losing some weight (cannot quantify or guess pounds lost) over an unspecified period of time  CT abd/pelvis with lobulated hypodense mass centered in the gastric antrum (6 4 x 4 8 cm) with intraluminal spread into the mid gastric body  No bowel obstruction  Signs of likely metastatic disease in ventral epigastric omental and lesser sac as well as multiple hypodense lesions throughout the liver (mets until proven otherwise)    Labs mostly unremarkable on admission other than anemia and iron deficiency  No known family history, but does not believe there have been any relatives with gastric or other GI malignancies  Lifelong non-smoker, does not drink EtOH, no illicit substance use  Having nausea at time of exam, otherwise noting tolerable RUQ pain with deep palpation  No other acute complaints   He states he had a single episode a few days ago (?) of what he would describe as coffee ground emesis  No recurrent episode  Denies: dysphagia, melena, changes to bowel habits or stool type, SOB, cough, CP, jaundice  Confirms Level 1 Code  Patient states his brother is involved in his shared medical decision making  #  Gastric cancer  -suspect primary origin based off of imaging  Large, newly diagnosed, now symptomatic gastric mass with concern for erosion and GIB  Although currently VSS and Hgb stable (diluted after IVF resuscitation prior to transfer)  -NPO at midnight for hopeful tissue biopsy diagnosis either via GI vs IR  -GI consult placed to first eval patient for possible EGD/biopsy  Will consult IR if appropriate  -IVF for maintenance hydration  -Palliative care consult placed  -pain regimen in- currently controlling symptoms fine  -anemia management as per below  -will consult heme-onc once biopsy obtained  -will hold DVT ppx with SUbQ lovenox for now, for hopeful procedure in am    -no current evidence of active blood loss such as with hematesis, vital sign change  Will continue to monitor for those and look for further Hgb decline on am labs    #  Metastatic disease  -radiographic evidence of mets until proven otherwise in liver, and ventral epigastric omental and lesser sacs  -will consult heme-onc once tissue diagnosis obtained  -palliative care consulted    #  Nausea, vomiting  -self reported single episode of coffee ground emesis  -PRN zofran and tigan ordered  -starting on Protonix 40 mg qd because of nausea  Doubt it will be helpful with eroding GI mass  -patient was on clears diet at transferring campus  Will continue clears until NPO at midnight, pending hopeful GI eval for biopsy       #  Anemia  -2/2 iron deficiency, chronic disease (malignancy), and suspected slowly eroding mass with possible UGI blood loss  -Venofer 200 mg x3 doses ordered  -monitor Hgb and transfuse for Hgb <7  -will stat GI consult if suspicion of active GI bleed is occuring    Code Status: Level 1 - Full Code  Admission Status: INPATIENT   Disposition: Patient requires Med/Surg  Expected Length of Stay: >2 midnights

## 2023-01-07 NOTE — CONSULTS
Chaplain Yoni Muse attempted a visit on 1/7,  and Charge nurse were unable to find a translation device   will pass along to next  on duty

## 2023-01-07 NOTE — UTILIZATION REVIEW
Initial Clinical Review    Admission: Date/Time/Statement:   Admission Orders (From admission, onward)     Ordered        01/06/23 2017  Inpatient Admission  Once                      Orders Placed This Encounter   Procedures   • Inpatient Admission     Standing Status:   Standing     Number of Occurrences:   1     Order Specific Question:   Level of Care     Answer:   Med Surg [16]     Order Specific Question:   Estimated length of stay     Answer:   More than 2 Midnights     Order Specific Question:   Certification     Answer:   I certify that inpatient services are medically necessary for this patient for a duration of greater than two midnights  See H&P and MD Progress Notes for additional information about the patient's course of treatment  Initial Presentation: 42 y o  male [facility] as [facility] for onoging work up of poss malignancy with gastric mass with metastatic disease  PMH: None  Went to ED with c/o RUQ sharp, constant and non-radiating abd pain, worse with movement, PP pain, particularly with spicy foods, mild nausea, weight loss  Imaging shows Multiple pulmonary nodules, several which appear to be intrapulmonary lymph nodes, 7 mm and smaller with unknown long-term stability, min BLL subsegmental atelectasis, 6 4 x 4 8 cm mass centered in the gastric antrum attributed to malignancy, Hepatic, ventral epigastric omental and lesser sac metastatic disease  He is admitted to INPATIENT status with Gastric mass - GI consult for EGD bx, Palliative care for goals of care discussion, Hep panel, HIV, IV Protonix, PRN analgesia, antiemetics, clear liquid diet, NPO p MN for EGD        Date: 1/7   Day 2:        1/7 GI Consult - Gastric mass - diff dx : gastric adenoCA, GIST, lymphoma, with metastatic disease, needs EGD w/ bx on 1/9, daily CBC, Oncology consult  Iron def anemia - DD - PUD, gastritis, AVAM, will start iron supplementation          Wt Readings from Last 1 Encounters:   01/07/23 79 4 kg (175 lb)     Additional Vital Signs:   01/07/23 08:05:32 97 9 °F (36 6 °C) 71 16 129/75 93 96 % --   01/06/23 22:09:31 97 9 °F (36 6 °C) 61 16 128/75 93 96 % --   01/06/23 2130 -- -- -- -- -- 96 % None (Room air)   01/06/23 20:24:40 97 9 °F (36 6 °C) 59 -- 126/74 91 95 % --     Pertinent Labs/Diagnostic Test Results:     1/5 CT CAP - CT chest:   Multiple pulmonary nodules, several of which appear to be intrapulmonary lymph nodes, 7 mm and smaller with unknown long-term stability  Given findings in the abdomen, CT chest follow-up in 3 months is advised    Minimal bilateral lower lobe subsegmental atelectasis      CT abdomen and pelvis:   6 4 x 4 8 cm mass centered in the gastric antrum attributed to malignancy  This may represent gastric adenocarcinoma, GIST, or lymphoma among other etiologies    Hepatic, ventral epigastric omental and lesser sac metastatic disease        Results from last 7 days   Lab Units 01/06/23  0512 01/04/23  2328   WBC Thousand/uL 8 34 9 91   HEMOGLOBIN g/dL 7 9* 9 0*   HEMATOCRIT % 24 6* 28 0*   PLATELETS Thousands/uL 407* 490*   NEUTROS ABS Thousands/µL  --  6 51         Results from last 7 days   Lab Units 01/06/23  0512 01/04/23  2328   SODIUM mmol/L 136 135   POTASSIUM mmol/L 4 2 4 3   CHLORIDE mmol/L 101 100   CO2 mmol/L 28 27   ANION GAP mmol/L 7 8   BUN mg/dL 12 15   CREATININE mg/dL 0 71 0 76   EGFR ml/min/1 73sq m 118 114   CALCIUM mg/dL 8 8 8 8   MAGNESIUM mg/dL 2 2  --      Results from last 7 days   Lab Units 01/06/23  0512 01/04/23  2328   AST U/L 48* 58*   ALT U/L 40 52   ALK PHOS U/L 405* 496*   TOTAL PROTEIN g/dL 6 8 7 9   ALBUMIN g/dL 3 1* 3 5   TOTAL BILIRUBIN mg/dL 0 46 0 33         Results from last 7 days   Lab Units 01/06/23  0512 01/04/23  2328   GLUCOSE RANDOM mg/dL 88 102       Results from last 7 days   Lab Units 01/04/23  2328   FERRITIN ng/mL 140     Results from last 7 days   Lab Units 01/06/23  2230   HEP B S AG  Non-reactive   HEP C AB  Non-reactive   HEP B C IGM  Non-reactive   HEP B C TOTAL AB  Non-reactive     Results from last 7 days   Lab Units 01/04/23  2328   LIPASE u/L 34                 Results from last 7 days   Lab Units 01/04/23  2334   CLARITY UA  Clear   COLOR UA  Yellow   SPEC GRAV UA  1 020   PH UA  6 0   GLUCOSE UA mg/dl Negative   KETONES UA mg/dl Negative   BLOOD UA  Negative   PROTEIN UA mg/dl Negative   NITRITE UA  Negative   BILIRUBIN UA  Negative   UROBILINOGEN UA E U /dl 1 0   LEUKOCYTES UA  Negative       No past medical history on file  Present on Admission:  • Gastric mass  • Metastatic disease (HCC)  • Iron deficiency anemia      Admitting Diagnosis: Gastric cancer (HCC) [C16 9]  Age/Sex: 42 y o  male  Admission Orders:  Scheduled Medications:  iron sucrose, 200 mg, Intravenous, Daily  pantoprazole, 40 mg, Intravenous, Q24H [address]      Continuous IV Infusions:  multi-electrolyte, 100 mL/hr, Intravenous, Continuous      PRN Meds:  acetaminophen, 975 mg, Oral, Q6H PRN  morphine injection, 4 mg, Intravenous, Q3H PRN  ondansetron, 4 mg, Intravenous, Q6H PRN - x 1 1/6  oxyCODONE, 5 mg, Oral, Q6H PRN  traMADol, 50 mg, Oral, Q6H PRN  trimethobenzamide, 200 mg, Intramuscular, Q6H PRN    Hep panel   Daily wt  Up w/ assist   NPO  IP CONSULT TO CASE MANAGEMENT  IP CONSULT TO GASTROENTEROLOGY    Network Utilization Review Department  ATTENTION: Please call with any questions or concerns to [phone] and carefully listen to the prompts so that you are directed to the right person  All voicemails are confidential   Shelton Rosa all requests for admission clinical reviews, approved or denied determinations and any other requests to dedicated fax number below belonging to the campus where the patient is receiving treatment   List of dedicated fax numbers for the Facilities:  [address] DENIALS (Administrative/Medical Necessity) [phone]   [address] [place] [address] (Maternity/NICU/Pediatrics) [phone]     [address]  [address] [phone]   [address] [place] [address] [address] 28 [facility] 310 [facility] 134 815 [facility] [phone]

## 2023-01-07 NOTE — H&P
INTERNAL MEDICINE RESIDENCY ADMISSION H&P     Name: Eladia Ramírez   Age & Sex: 42 y o  male   MRN: [id number]  Unit/Bed#: [facility] 919-01   Encounter: [id number]  Primary Care Provider: No primary care provider on file      Code Status: Level 1 - Full Code  Admission Status: INPATIENT   Disposition: Patient requires Med/Surg    Admit to team: SOD Team A    ASSESSMENT/PLAN     Principal Problem:    Gastric mass  Active Problems:    Iron deficiency anemia    Metastatic disease (HCC)    Iron deficiency    Nausea and vomiting      Nausea and vomiting  Assessment & Plan  -Zofran 4 mg q6 and Tigan 200 mg q6 PRN    Iron deficiency  Assessment & Plan  -Continue with Venofer 200 mg daily    Metastatic disease (HCC)  Assessment & Plan  -see Gastric mass A&P    Iron deficiency anemia  Assessment & Plan  -Patient presenting with Hg 9 0 and MCV 74; Iron 20, TIBC 441, and Iron Sat 5  -Likely iron-deficiency anemia in setting of GI blood loss 2/2 gastric mass  -This AM Hgb did decrease to 7 9, however this may be setting of dilution from continuous fluids, however cannot r/o bleed as patient reported an episode of coffee-ground emesis   -Will continue Venofer 200 mg daily  -Will start IV Protonix 40 mg daily over concern for GI bleed   -Gastroenterology consulted for potential EGD, appreciate recommendations  -Trend CBC  -Transfuse if Hgb <7    * Gastric mass  Assessment & Plan  -Patient presenting with 2 wk hx of RUQ post-prandial pain found to have gastric antral mass as well as potential mets to liver, omentum, and lung  -Consult GI for EGD biopsy, appreciate recommendations  -Consult palliative care for [address] discussion, appreciate recommendations  -Hepatitis panel and HIV  -IV Protonix 40 mg over concern for GI bleeding  -Continue DVT ppx for now, but low threshold to stop if acute Hgb drop or any episode of hematemesis  -Tylenol 975 mg q6 PRN for mild pain, Tramadol 50 mg q6 PRN for moderate pain, Oxy 5 mg q6 PRN for severe pain, and Morphine 4mg q3 PRN for breakthrough pain  -Zofran and Tigan PRN for nausea   -Clear liquid diet  -NPO @ midnight for potential EGD      VTE Pharmacologic Prophylaxis: Enoxaparin (Lovenox)  VTE Mechanical Prophylaxis: sequential compression device    CHIEF COMPLAINT   No chief complaint on file  HISTORY OF PRESENT ILLNESS     Balaji Cortes is a 41 yo M with no significant PMH who presented on [facility] ED on 01/04 with 15 day hx of RUQ sharp, constant and non-radiating abdominal pain  Pain was worsened with movement  Endorsed post-prandial pain, particularly with spicy foods  Reported mild nausea  Initially did not have any emesis, but did have one episode of coffee-ground emesis at [facility]  Reports weight loss over last few months  Denies any dysphagia, but does report nausea when attempting to swallow  Denied any fevers, chills, chest pain, dyspnea, diarrhea, melena, hematochezia, or greasy stool  Patient resides in [facility] and is currently visiting his brother  Brother has been involved in his care so far  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  Denies tobacco, alcohol, or illicit drug use  Denies any family hx of gastric or any other malignancy  In ED, CTAP revealed a 6 4 cm x 4 8 cm mass in gastric antrum, multiple hypodense lesions scattered throughout liver, ventral epigastric omental and lesser sac soft tissue nodules and multiple pulmonary nodules  He is being transferred for further workup of gastric mass with metastatic disease  On presentation, T 97 9, HR 59, /74, and SpO2 95%  Lab-work significant for AST 48, ALT 40, Alk Phos 405, Hgb 7 9 (MCV 73), Plt 407, Iron 20, TIBC 441, and Iron Sat 5      REVIEW OF SYSTEMS     Review of Systems   Constitutional: Positive for unexpected weight change  Negative for chills and fever  HENT: Negative for congestion, rhinorrhea and sore throat      Respiratory: Negative for cough and shortness of breath  Cardiovascular: Negative for chest pain  Gastrointestinal: Positive for abdominal pain, nausea and vomiting  Negative for blood in stool and diarrhea  Genitourinary: Negative for dysuria  Musculoskeletal: Negative for arthralgias  Skin: Negative for rash  Neurological: Negative for dizziness and light-headedness  Psychiatric/Behavioral: Negative for agitation  OBJECTIVE     Vitals:    23 2209   BP: 126/74 128/75   Pulse: 59 61   Resp:  16   Temp: 97 9 °F (36 6 °C) 97 9 °F (36 6 °C)   SpO2: 95% 96%      Temperature:   Temp (24hrs), Av 1 °F (36 7 °C), Min:97 9 °F (36 6 °C), Max:98 4 °F (36 9 °C)    Temperature: 97 9 °F (36 6 °C)  Intake & Output:  I/O     None        Weights: There is no height or weight on file to calculate BMI  Weight (last 2 days)     None        Physical Exam  Constitutional:       General: He is not in acute distress  HENT:      Head: Normocephalic and atraumatic  Mouth/Throat:      Mouth: Mucous membranes are moist       Pharynx: Oropharynx is clear  Eyes:      General: No scleral icterus  Conjunctiva/sclera: Conjunctivae normal    Cardiovascular:      Rate and Rhythm: Normal rate and regular rhythm  Heart sounds: No murmur heard  No friction rub  No gallop  Pulmonary:      Effort: Pulmonary effort is normal  No respiratory distress  Breath sounds: Normal breath sounds  No wheezing or rales  Abdominal:      General: Abdomen is flat  Palpations: Abdomen is soft  Comments: Abdomen soft and non-distended; Tenderness to deep palpation in RUQ; No voluntary guarding or rebound    Musculoskeletal:      Right lower leg: No edema  Left lower leg: No edema  Skin:     General: Skin is warm and dry  Neurological:      Mental Status: He is oriented to person, place, and time     Psychiatric:         Mood and Affect: Mood normal          Behavior: Behavior normal        PAST MEDICAL HISTORY   No past medical history on file  PAST SURGICAL HISTORY   No past surgical history on file  SOCIAL & FAMILY HISTORY     Social History     Substance and Sexual Activity   Alcohol Use None       Social History     Substance and Sexual Activity   Drug Use Not on file     Social History     Tobacco Use   Smoking Status Never   Smokeless Tobacco Never     No family history on file  LABORATORY DATA     Labs: I have personally reviewed pertinent reports  Results from last 7 days   Lab Units 01/06/23  0512 01/04/23  2328   WBC Thousand/uL 8 34 9 91   HEMOGLOBIN g/dL 7 9* 9 0*   HEMATOCRIT % 24 6* 28 0*   PLATELETS Thousands/uL 407* 490*   NEUTROS PCT %  --  66   MONOS PCT %  --  9      Results from last 7 days   Lab Units 01/06/23  0512 01/04/23  2328   POTASSIUM mmol/L 4 2 4 3   CHLORIDE mmol/L 101 100   CO2 mmol/L 28 27   BUN mg/dL 12 15   CREATININE mg/dL 0 71 0 76   CALCIUM mg/dL 8 8 8 8   ALK PHOS U/L 405* 496*   ALT U/L 40 52   AST U/L 48* 58*     Results from last 7 days   Lab Units 01/06/23  0512   MAGNESIUM mg/dL 2 2                      Micro:  No results found for: Dalbert Sires, WOUNDCULT, SPUTUMCULTUR  IMAGING & DIAGNOSTIC TESTS     Imaging: I have personally reviewed pertinent reports  No results found  EKG, Pathology, and Other Studies: I have personally reviewed pertinent reports       ALLERGIES   No Known Allergies  MEDICATIONS PRIOR TO ARRIVAL     Prior to Admission medications    Not on File     MEDICATIONS ADMINISTERED IN LAST 24 HOURS     Medication Administration - last 24 hours from 01/05/2023 2239 to 01/06/2023 2239       Date/Time Order Dose Route Action Action by     01/06/2023 2103 EST multi-electrolyte (PLASMALYTE-A/ISOLYTE-S PH 7 4) IV solution 0 mL/hr Intravenous Stopped Dustin Verde     01/06/2023 2047 EST multi-electrolyte (PLASMALYTE-A/ISOLYTE-S PH 7 4) IV solution 100 mL/hr Intravenous New Bag Dustin Verde     01/06/2023 2121 EST ondansetron (ZOFRAN) injection 4 mg 4 mg Intravenous Given Óscar Isabell     01/06/2023 2128 EST iron sucrose (VENOFER) 200 mg in sodium chloride 0 9 % 100 mL IVPB 200 mg Intravenous New Bag Óscar Whyte     01/06/2023 2148 EST pantoprazole (PROTONIX) injection 40 mg 40 mg Intravenous Given Brandysree Slater        CURRENT MEDICATIONS     Current Facility-Administered Medications   Medication Dose Route Frequency Provider Last Rate   • acetaminophen  975 mg Oral Q6H PRN Capyesenia Quintero DO     • [START ON 1/7/2023] enoxaparin  40 mg Subcutaneous Daily Capricnanette Quintero DO     • iron sucrose  200 mg Intravenous Daily Capricnanette Quintero  mg (01/06/23 2128)   • morphine injection  4 mg Intravenous Q3H PRN Lazara Robel DO     • multi-electrolyte  100 mL/hr Intravenous Continuous Caprice DO Leon Stopped (01/06/23 2103)   • ondansetron  4 mg Intravenous Q6H PRN Capricnanette Quintero DO     • oxyCODONE  5 mg Oral Q6H PRN Capricnanette Quintero DO     • pantoprazole  40 mg Intravenous Q24H [facility] Capyesenia Quintero DO     • traMADol  50 mg Oral Q6H PRN Capricnanette Quintero DO     • trimethobenzamide  200 mg Intramuscular Q6H PRN Capyesenia Quintero DO       multi-electrolyte, 100 mL/hr, Last Rate: Stopped (01/06/23 2103)      acetaminophen, 975 mg, Q6H PRN  morphine injection, 4 mg, Q3H PRN  ondansetron, 4 mg, Q6H PRN  oxyCODONE, 5 mg, Q6H PRN  traMADol, 50 mg, Q6H PRN  trimethobenzamide, 200 mg, Q6H PRN        Admission Time  I spent 30 minutes admitting the patient  This involved direct patient contact where I performed a full history and physical, reviewing previous records, and reviewing laboratory and other diagnostic studies  Portions of the record may have been created with voice recognition software  Occasional wrong word or "sound a like" substitutions may have occurred due to the inherent limitations of voice recognition software    Read the chart carefully and recognize, using context, where substitutions have occurred     ==  Peggy Nino MD  [facility]  Internal Medicine Residency PGY-1

## 2023-01-08 PROBLEM — E61.1 IRON DEFICIENCY: Status: RESOLVED | Noted: 2023-01-06 | Resolved: 2023-01-08

## 2023-01-08 LAB
ALBUMIN SERPL BCP-MCNC: 2.5 G/DL (ref 3.5–5)
ALP SERPL-CCNC: 389 U/L (ref 46–116)
ALT SERPL W P-5'-P-CCNC: 41 U/L (ref 12–78)
ANION GAP SERPL CALCULATED.3IONS-SCNC: 7 MMOL/L (ref 4–13)
AST SERPL W P-5'-P-CCNC: 142 U/L (ref 5–45)
BILIRUB SERPL-MCNC: 0.69 MG/DL (ref 0.2–1)
BUN SERPL-MCNC: 13 MG/DL (ref 5–25)
CALCIUM ALBUM COR SERPL-MCNC: 9.7 MG/DL (ref 8.3–10.1)
CALCIUM SERPL-MCNC: 8.5 MG/DL (ref 8.3–10.1)
CHLORIDE SERPL-SCNC: 104 MMOL/L (ref 96–108)
CO2 SERPL-SCNC: 28 MMOL/L (ref 21–32)
CREAT SERPL-MCNC: 0.74 MG/DL (ref 0.6–1.3)
ERYTHROCYTE [DISTWIDTH] IN BLOOD BY AUTOMATED COUNT: 14.1 % (ref 11.6–15.1)
GFR SERPL CREATININE-BSD FRML MDRD: 116 ML/MIN/1.73SQ M
GLUCOSE SERPL-MCNC: 81 MG/DL (ref 65–140)
HCT VFR BLD AUTO: 27.3 % (ref 36.5–49.3)
HGB BLD-MCNC: 8.2 G/DL (ref 12–17)
MCH RBC QN AUTO: 22.9 PG (ref 26.8–34.3)
MCHC RBC AUTO-ENTMCNC: 30 G/DL (ref 31.4–37.4)
MCV RBC AUTO: 76 FL (ref 82–98)
PLATELET # BLD AUTO: 460 THOUSANDS/UL (ref 149–390)
PMV BLD AUTO: 9.4 FL (ref 8.9–12.7)
POTASSIUM SERPL-SCNC: 4.1 MMOL/L (ref 3.5–5.3)
PROT SERPL-MCNC: 6.8 G/DL (ref 6.4–8.4)
RBC # BLD AUTO: 3.58 MILLION/UL (ref 3.88–5.62)
SODIUM SERPL-SCNC: 139 MMOL/L (ref 135–147)
WBC # BLD AUTO: 7.28 THOUSAND/UL (ref 4.31–10.16)

## 2023-01-08 RX ORDER — ACETAMINOPHEN 325 MG/1
975 TABLET ORAL EVERY 6 HOURS PRN
Status: DISCONTINUED | OUTPATIENT
Start: 2023-01-08 | End: 2023-01-10 | Stop reason: HOSPADM

## 2023-01-08 RX ADMIN — MORPHINE SULFATE 4 MG: 4 INJECTION INTRAVENOUS at 09:36

## 2023-01-08 RX ADMIN — IRON SUCROSE 200 MG: 20 INJECTION, SOLUTION INTRAVENOUS at 09:35

## 2023-01-08 RX ADMIN — PANTOPRAZOLE SODIUM 40 MG: 40 INJECTION, POWDER, FOR SOLUTION INTRAVENOUS at 09:35

## 2023-01-08 RX ADMIN — ENOXAPARIN SODIUM 40 MG: 40 INJECTION SUBCUTANEOUS at 09:24

## 2023-01-08 NOTE — ASSESSMENT & PLAN NOTE
Patient presenting with 2 wk hx of RUQ post-prandial pain found to have gastric antral mass as well as potential mets to liver, omentum, and lung    Plan:  -GI consult: EGD/EUS completed 1/9 with biopsies of gastric mass and liver  Gastric mass (mainly involving antrum) biopsy confirmed gastric adenocarcinoma  During procedure, extensive lymphadenopathy seen in celiac region  Liver biopsy unrevealing  Lymph node FNA still pending at time of discharge  If FNA is unrevealing, patient needs targeted liver biopsy to confirm metastasis  -Consulted heme onc, appreciate recs: "Suspect gastric malignancy with stage IV disease and extensive metastases to abdominal cavity, omentum, liver and likely in the lungs  Microcytic anemia secondary to chronic GI blood loss resulting in iron deficiency and also compounded by anemia of chronic disease and that is why ferritin is not that low, 140  Iron saturation 5%  Plan to reevaluate after the biopsies   Genetic testing because of gastric malignancy at a young age "  -Hospice appropriate at this time given poor prognosis  -Hepatitis panel and HIV negative  -Continue IV Protonix 40 mg over concern for GI bleeding  -Continue DVT ppx for now, but low threshold to stop if acute Hgb drop or any episode of hematemesis  -Tylenol 975 mg q6 PRN for mild pain, Tramadol 50 mg q6 PRN for moderate pain, Oxy 5 mg q6 PRN for severe pain, and Morphine 4mg q3 PRN for breakthrough pain  -Zofran and Tigan PRN for nausea   -Clear liquid diet continue as tolerated with ensures

## 2023-01-08 NOTE — PROGRESS NOTES
INTERNAL MEDICINE RESIDENCY PROGRESS NOTE     Name: Valerie Akers   Age & Sex: 42 y o  male   MRN: [id number]  Unit/Bed#: [facility] 919-01   Encounter: [id number]  Team: SOD Team A    PATIENT INFORMATION     Name: Valerie Akers   Age & Sex: 42 y o  male   MRN: [id number]  Hospital Stay Days: 2    ASSESSMENT/PLAN     Principal Problem:    Gastric mass  Active Problems:    Iron deficiency anemia    Metastatic disease (HCC)    Nausea and vomiting      * Gastric mass  Assessment & Plan  Patient presenting with 2 wk hx of RUQ post-prandial pain found to have gastric antral mass as well as potential mets to liver, omentum, and lung    · Consult GI for EGD biopsy, appreciate recommendations  · Plan for EGD on Monday 1/9, NPO midinight  · IR consult for liver biopsy   · Consulted heme onc, pending reccs   · Ultimately patient would like to return home to [facility] for his final days  · Hospice appropriate at this time given poor prognosis  · Hepatitis panel and HIV negative  · On IV Protonix 40 mg over concern for GI bleeding  · Continue DVT ppx for now, but low threshold to stop if acute Hgb drop or any episode of hematemesis  · Tylenol 975 mg q6 PRN for mild pain, Tramadol 50 mg q6 PRN for moderate pain, Oxy 5 mg q6 PRN for severe pain, and Morphine 4mg q3 PRN for breakthrough pain  · Zofran and Tigan PRN for nausea   · Clear liquid diet continue as tolerated with ensures    Nausea and vomiting  Assessment & Plan  · Zofran 4 mg q6 and Tigan 200 mg q6 PRN     Metastatic disease ([address] )  Assessment & Plan  -see Gastric Mass A&P     Iron deficiency anemia  Assessment & Plan  Patient presenting with Hg 9 0 and MCV 74; Iron 20, TIBC 441, and Iron Sat 5  Likely iron-deficiency anemia in setting of GI blood loss 2/2 gastric mass    · Hb improving  · patient reported an episode of coffee-ground emesis previously   No longer having episodes currently  · Continue Venofer 200 mg daily  · On IV Protonix 40 mg daily over concern for GI bleed   · Gastroenterology consulted for potential EGD, appreciate recommendations  · Plan for EGD on   · Trend CBC  · Transfuse if Hgb <7    Iron deficiency-resolved as of 2023  Assessment & Plan  · 2/2 to gi malignancy  · Continue with Venofer 200 mg daily       Disposition: remain inpatient for bx's and EGD    SUBJECTIVE     Patient seen and examined  No acute events overnight  Patient is quite stoic  He complains of mild 4/10 abdominal/epigastric pain  No new n/v/d/c; no bloody BM or emesis  Patient is appropriately tearful and understands overall poor prognosis even without dx of which type of cancer  All questions answered to satisfaction  OBJECTIVE     Vitals:    23 0805 23 1434 23 2130 23 0748   BP: 129/75 129/65  113/63   Pulse: 71      Resp: 16 18  16   Temp: 97 9 °F (36 6 °C) 97 7 °F (36 5 °C)  97 7 °F (36 5 °C)   SpO2: 96%  96%    Weight:       Height:          Temperature:   Temp (24hrs), Av 7 °F (36 5 °C), Min:97 7 °F (36 5 °C), Max:97 7 °F (36 5 °C)    Temperature: 97 7 °F (36 5 °C)  Intake & Output:  I/O        0701   0700  0701   0700  0701   0700    I V  (mL/kg)  446 7 (5 6)     IV Piggyback  200     Total Intake(mL/kg)  646 7 (8 1)     Net  +646 7            Unmeasured Urine Occurrence  5 x         Weights:   IBW (Ideal Body Weight): 63 8 kg    Body mass index is 28 25 kg/m²  Weight (last 2 days)     Date/Time Weight    23 0100 79 4 (175)        Physical Exam  Constitutional:       Appearance: He is not ill-appearing  Cardiovascular:      Rate and Rhythm: Normal rate and regular rhythm  Heart sounds: No murmur heard  Pulmonary:      Effort: Pulmonary effort is normal       Breath sounds: No wheezing or rales  Abdominal:      General: Bowel sounds are normal       Palpations: Abdomen is soft  Tenderness: There is abdominal tenderness (mild to palpation of epigastric area)   There is no guarding or rebound  Neurological:      Mental Status: He is alert  Psychiatric:         Behavior: Behavior normal       Comments: Depressed mood and affect, appropriate       LABORATORY DATA     Labs: I have personally reviewed pertinent reports  Results from last 7 days   Lab Units 01/08/23  0539 01/07/23  1033 01/06/23  0512 01/04/23  2328   WBC Thousand/uL 7 28 7 63 8 34 9 91   HEMOGLOBIN g/dL 8 2* 8 2* 7 9* 9 0*   HEMATOCRIT % 27 3* 26 2* 24 6* 28 0*   PLATELETS Thousands/uL 460* 456* 407* 490*   NEUTROS PCT %  --  76*  --  66   MONOS PCT %  --  9  --  9      Results from last 7 days   Lab Units 01/08/23  0539 01/07/23  1033 01/06/23  0512   POTASSIUM mmol/L 4 1 3 9 4 2   CHLORIDE mmol/L 104 104 101   CO2 mmol/L 28 26 28   BUN mg/dL 13 14 12   CREATININE mg/dL 0 74 0 80 0 71   CALCIUM mg/dL 8 5 8 7 8 8   ALK PHOS U/L 389* 430* 405*   ALT U/L 41 43 40   AST U/L 142* 129* 48*     Results from last 7 days   Lab Units 01/06/23  0512   MAGNESIUM mg/dL 2 2                        IMAGING & DIAGNOSTIC TESTING     Radiology Results: I have personally reviewed pertinent reports  No results found  Other Diagnostic Testing: I have personally reviewed pertinent reports      ACTIVE MEDICATIONS     Current Facility-Administered Medications   Medication Dose Route Frequency   • acetaminophen (TYLENOL) tablet 975 mg  975 mg Oral Q6H PRN   • enoxaparin (LOVENOX) subcutaneous injection 40 mg  40 mg Subcutaneous Q24H BILLY   • morphine injection 4 mg  4 mg Intravenous Q3H PRN   • ondansetron (ZOFRAN) injection 4 mg  4 mg Intravenous Q6H PRN   • oxyCODONE (ROXICODONE) IR tablet 5 mg  5 mg Oral Q6H PRN   • pantoprazole (PROTONIX) injection 40 mg  40 mg Intravenous Q24H BILLY   • polyethylene glycol (MIRALAX) packet 17 g  17 g Oral Daily   • traMADol (ULTRAM) tablet 50 mg  50 mg Oral Q6H PRN   • trimethobenzamide (TIGAN) IM injection 200 mg  200 mg Intramuscular Q6H PRN       VTE Pharmacologic Prophylaxis: Enoxaparin (Lovenox)  VTE Mechanical Prophylaxis: sequential compression device    Portions of the record may have been created with voice recognition software  Occasional wrong word or "sound a like" substitutions may have occurred due to the inherent limitations of voice recognition software    Read the chart carefully and recognize, using context, where substitutions have occurred   ==  Chris Magdaleno, [address] [facility]  Internal Medicine Residency PGY-3

## 2023-01-08 NOTE — TELEMEDICINE
e-Consult (IPC)  - Interventional Radiology  Les Henning 42 y o  male MRN: [id number]  Unit/Bed#: [facility] 919-01 Encounter: [id number]          Interventional Radiology has been consulted to evaluate Les Henning    We were consulted by [facility] concerning this patient with liver masses  IR Consult-(For Para, Thora, LP, PICC(for GFR>/=45) use the specific ordersets  Consult performed by: Hailee Johansen MD  Consult ordered by: Marichuy Alonso MD        01/07/23    Assessment/Recommendation:   64-year-old male presenting with abdominal pain and imaging demonstrating a gastric mass and multiple large liver masses  Pattern is suggestive of metastatic gastric carcinoma  Patient is scheduled for EGD on Monday  Biopsy of liver masses is needed for definitive staging  Will be dependent on IR schedule  We will plan tentatively for Tuesday  Will need to hold DVT prophylaxis if ordered  5-10 minutes, >50% of the total time devoted to medical consultative verbal/EMR discussion between providers  Written report will be generated in the EMR  Thank you for allowing Interventional Radiology to participate in the care of Les Henning  Please don't hesitate to call or TigerText us with any questions       Hailee Johansen MD

## 2023-01-08 NOTE — CONSULTS
Consultation - Medical Oncology   Maximino Gonzales 42 y o  male MRN: [id number]  Unit/Bed#: [facility] 919-01 Encounter: [id number]  Referring physician: BENITO  Date of consultation: 1/8/23  Reason for Consult: Suspected gastric malignancy  HPI: Maximino Gonzales is a 42y o  year old male   Patient speaks Togolese and he seems to understand some [place]  There is plenty of information in the medical records and for that reason I did not to use   Patient's present symptom is pain ([place]) in the upper abdomen  Per H PI in H&P there is history of upper abdominal pain, nausea with one episode of coffee-ground emesis and weight loss  No significant past history  ROS:  01/08/23  Reviewed in H&P and see above        Historical Information   No past medical history on file  No past surgical history on file  Social History   Social History     Substance and Sexual Activity   Alcohol Use Never     Social History     Substance and Sexual Activity   Drug Use Never     Social History     Tobacco Use   Smoking Status Never   Smokeless Tobacco Never     Family History: No family history on file        Current Facility-Administered Medications:   •  acetaminophen (TYLENOL) tablet 975 mg, 975 mg, Oral, Q6H PRN, Maria Isabel Quintero DO  •  enoxaparin (LOVENOX) subcutaneous injection 40 mg, 40 mg, Subcutaneous, Q24H [address], Corina Marcos MD, 40 mg at 01/08/23 3241  •  morphine injection 4 mg, 4 mg, Intravenous, Q3H PRN, Claritza Feng DO, 4 mg at 01/08/23 0936  •  ondansetron (ZOFRAN) injection 4 mg, 4 mg, Intravenous, Q6H PRN, Maria Isabel Quintero DO, 4 mg at 01/06/23 2121  •  oxyCODONE (ROXICODONE) IR tablet 5 mg, 5 mg, Oral, Q6H PRN, Maria Isabel Quintero DO  •  pantoprazole (PROTONIX) injection 40 mg, 40 mg, Intravenous, Q24H [address], Maria Isabel Quintero DO, 40 mg at 01/08/23 0935  •  polyethylene glycol (MIRALAX) packet 17 g, 17 g, Oral, Daily, Fartun Blankenship MD  •  traMADol (ULTRAM) tablet 50 mg, 50 mg, Oral, Q6H PRN, Jeannine Freud, DO  •  trimethobenzamide (TIGAN) IM injection 200 mg, 200 mg, Intramuscular, Q6H PRN, Jeannine Freud, DO    No Known Allergies  @ ROS@  Physical Exam:  Vitals:    01/07/23 0805 01/07/23 1434 01/07/23 2130 01/08/23 0748   BP: 129/75 129/65  113/63   Pulse: 71      Resp: 16 18  16   Temp: 97 9 °F (36 6 °C) 97 7 °F (36 5 °C)  97 7 °F (36 5 °C)   SpO2: 96%  96%    Weight:       Height:         Alert,  not in distress, vitals are above, no icterus, no oral thrush, no palpable neck mass, clear lung fields, regular heart rate, abdomen  soft and non tender, no palpable abdominal mass, no ascites, no edema of ankles, no calf tenderness, no focal neurological deficit, no skin rash, no palpable lymphadenopathy in the neck and axillary areas,  no clubbing         Lab Results: I have reviewed all pertinent labs    LABS:  Results for orders placed or performed during the hospital encounter of 01/06/23   Chronic Hepatitis Panel   Result Value Ref Range    Hepatitis B Surface Ag Non-reactive Non-reactive, NonReactive - Confirmed    Hepatitis C Ab Non-reactive Non-reactive    Hep B C IgM Non-reactive Non-reactive    Hep B Core Total Ab Non-reactive Non-reactive   RAPID HIV 1/2 AB-AG COMBO for 15years old and above   Result Value Ref Range    Rapid HIV 1 AND 2 Non-Reactive Non-Reactive    HIV-1 P24 Ag Screen Non-Reactive Non-Reactive   CBC and differential   Result Value Ref Range    WBC 7 63 4 31 - 10 16 Thousand/uL    RBC 3 50 (L) 3 88 - 5 62 Million/uL    Hemoglobin 8 2 (L) 12 0 - 17 0 g/dL    Hematocrit 26 2 (L) 36 5 - 49 3 %    MCV 75 (L) 82 - 98 fL    MCH 23 4 (L) 26 8 - 34 3 pg    MCHC 31 3 (L) 31 4 - 37 4 g/dL    RDW 14 1 11 6 - 15 1 %    MPV 8 9 8 9 - 12 7 fL    Platelets 469 (H) 248 - 390 Thousands/uL    nRBC 0 /100 WBCs    Neutrophils Relative 76 (H) 43 - 75 %    Immat GRANS % 0 0 - 2 %    Lymphocytes Relative 13 (L) 14 - 44 %    Monocytes Relative 9 4 - 12 %    Eosinophils Relative 1 0 - 6 %    Basophils Relative 1 0 - 1 %    Neutrophils Absolute 5 77 1 85 - 7 62 Thousands/µL    Immature Grans Absolute 0 02 0 00 - 0 20 Thousand/uL    Lymphocytes Absolute 1 00 0 60 - 4 47 Thousands/µL    Monocytes Absolute 0 70 0 17 - 1 22 Thousand/µL    Eosinophils Absolute 0 10 0 00 - 0 61 Thousand/µL    Basophils Absolute 0 04 0 00 - 0 10 Thousands/µL   Basic metabolic panel   Result Value Ref Range    Sodium 138 135 - 147 mmol/L    Potassium 3 9 3 5 - 5 3 mmol/L    Chloride 104 96 - 108 mmol/L    CO2 26 21 - 32 mmol/L    ANION GAP 8 4 - 13 mmol/L    BUN 14 5 - 25 mg/dL    Creatinine 0 80 0 60 - 1 30 mg/dL    Glucose 84 65 - 140 mg/dL    Calcium 8 7 8 3 - 10 1 mg/dL    eGFR 112 ml/min/1 73sq m   Hepatic function panel   Result Value Ref Range    Total Bilirubin 0 41 0 20 - 1 00 mg/dL    Bilirubin, Direct 0 11 0 00 - 0 20 mg/dL    Alkaline Phosphatase 430 (H) 46 - 116 U/L     (H) 5 - 45 U/L    ALT 43 12 - 78 U/L    Total Protein 7 2 6 4 - 8 4 g/dL    Albumin 2 5 (L) 3 5 - 5 0 g/dL   CBC and Platelet   Result Value Ref Range    WBC 7 28 4 31 - 10 16 Thousand/uL    RBC 3 58 (L) 3 88 - 5 62 Million/uL    Hemoglobin 8 2 (L) 12 0 - 17 0 g/dL    Hematocrit 27 3 (L) 36 5 - 49 3 %    MCV 76 (L) 82 - 98 fL    MCH 22 9 (L) 26 8 - 34 3 pg    MCHC 30 0 (L) 31 4 - 37 4 g/dL    RDW 14 1 11 6 - 15 1 %    Platelets 987 (H) 967 - 390 Thousands/uL    MPV 9 4 8 9 - 12 7 fL   Comprehensive metabolic panel   Result Value Ref Range    Sodium 139 135 - 147 mmol/L    Potassium 4 1 3 5 - 5 3 mmol/L    Chloride 104 96 - 108 mmol/L    CO2 28 21 - 32 mmol/L    ANION GAP 7 4 - 13 mmol/L    BUN 13 5 - 25 mg/dL    Creatinine 0 74 0 60 - 1 30 mg/dL    Glucose 81 65 - 140 mg/dL    Calcium 8 5 8 3 - 10 1 mg/dL    Corrected Calcium 9 7 8 3 - 10 1 mg/dL     (H) 5 - 45 U/L    ALT 41 12 - 78 U/L    Alkaline Phosphatase 389 (H) 46 - 116 U/L    Total Protein 6 8 6 4 - 8 4 g/dL    Albumin 2 5 (L) 3 5 - 5 0 g/dL    Total Bilirubin 0 69 0 20 - 1 00 mg/dL    eGFR 116 ml/min/1 73sq m     Reviewed lab results  Microcytic anemia, abnormal liver function tests, normal PT and PTT, reactive thrombocytosis    Imaging Studies: I have personally reviewed pertinent reports  OSSEOUS STRUCTURES:  No acute fracture or destructive osseous lesion  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      IMPRESSION:     CT chest:     Multiple pulmonary nodules, several of which appear to be intrapulmonary lymph nodes, 7 mm and smaller with unknown long-term stability  Given findings in the abdomen, CT chest follow-up in 3 months is advised      Minimal bilateral lower lobe subsegmental atelectasis      CT abdomen and pelvis:     6 4 x 4 8 cm mass centered in the gastric antrum attributed to malignancy  This may represent gastric adenocarcinoma, GIST, or lymphoma among other etiologies      Hepatic, ventral epigastric omental and lesser sac metastatic disease      This study demonstrates a significant  finding and was documented as such in [employer] for liaison and referring practitioner notification      This study demonstrates a finding requiring imaging follow-up and was documented as such in Epic         Workstation performed: [id number]        Imaging    CT chest abdomen pelvis w contrast (Order: [id number]) - 1/5/2023  Result History    CT chest abdomen pelvis w contrast (Order #[id number]) on 1/5/2023 - Order Result History Report    Pathology, and Other Studies: I have personally reviewed pertinent reports  Assessment and Plan:  See diagnoses, orders instructions below  1) Suspect gastric malignancy with stage IV disease and extensive metastases to abdominal cavity, omentum, liver and likely in the lungs  Patient is scheduled to have EGD and liver biopsy has also been requested    2) Microcytic anemia secondary to chronic GI blood loss resulting in iron deficiency and also compounded by anemia of chronic disease and that is why ferritin is not that low, 140  Iron saturation 5%  Patient is receiving intravenous Venofer  3)  Reactive thrombocytosis    To reevaluate after the biopsies  Genetic testing because of gastric malignancy at a young age  Disclaimer: I used dictation device to dictate my note and there could be mistakes in my note

## 2023-01-08 NOTE — ASSESSMENT & PLAN NOTE
Patient presenting with Hg 9 0 and MCV 74; Iron 20, TIBC 441, and Iron Sat 5  Likely iron-deficiency anemia in setting of GI blood loss 2/2 gastric mass  Hb improving  Patient reported an episode of coffee-ground emesis previously  No longer having episodes currently      Plan:  -Continue Venofer 200 mg daily  -Continue IV Protonix 40 mg daily over concern for GI bleed   -Gastroenterology consulted; EGD completed 1/9  -No overt signs of bleeding  · Trend CBC  · Transfuse if Hgb <7

## 2023-01-08 NOTE — PLAN OF CARE
Problem: PAIN - ADULT  Goal: Verbalizes/displays adequate comfort level or baseline comfort level  Description: Interventions:  - Encourage patient to monitor pain and request assistance  - Assess pain using appropriate pain scale  - Administer analgesics based on type and severity of pain and evaluate response  - Implement non-pharmacological measures as appropriate and evaluate response  - Consider cultural and social influences on pain and pain management  - Notify physician/advanced practitioner if interventions unsuccessful or patient reports new pain  Outcome: Progressing     Problem: INFECTION - ADULT  Goal: Absence or prevention of progression during hospitalization  Description: INTERVENTIONS:  - Assess and monitor for signs and symptoms of infection  - Monitor lab/diagnostic results  - Monitor all insertion sites, i e  indwelling lines, tubes, and drains  - Monitor endotracheal if appropriate and nasal secretions for changes in amount and color  - [place] appropriate cooling/warming therapies per order  - Administer medications as ordered  - Instruct and encourage patient and family to use good hand hygiene technique  - Identify and instruct in appropriate isolation precautions for identified infection/condition  Outcome: Progressing

## 2023-01-08 NOTE — PROGRESS NOTES
INTERNAL MEDICINE RESIDENCY PROGRESS NOTE     Name: Valerie Akers   Age & Sex: 42 y o  male   MRN: [id number]  Unit/Bed#: [facility] 919-01   Encounter: [id number]  Team: SOD Team A    PATIENT INFORMATION     Name: Valerie Akers   Age & Sex: 42 y o  male   MRN: [id number]  Hospital Stay Days: 1    ASSESSMENT/PLAN     Principal Problem:    Gastric mass  Active Problems:    Iron deficiency anemia    Metastatic disease (HCC)    Iron deficiency    Nausea and vomiting      * Gastric mass  Assessment & Plan  Patient presenting with 2 wk hx of RUQ post-prandial pain found to have gastric antral mass as well as potential mets to liver, omentum, and lung    · Consult GI for EGD biopsy, appreciate recommendations  · Plan for EGD on Monday 1/9  · Recommend IR consult for liver biopsy   · IR consulted for biopsy of liver lesion, plan for Tuesday 1/10  · Consulted heme onc, pending reccs   · Hepatitis panel and HIV negative  · On IV Protonix 40 mg over concern for GI bleeding  · Continue DVT ppx for now, but low threshold to stop if acute Hgb drop or any episode of hematemesis  · Tylenol 975 mg q6 PRN for mild pain, Tramadol 50 mg q6 PRN for moderate pain, Oxy 5 mg q6 PRN for severe pain, and Morphine 4mg q3 PRN for breakthrough pain  · Zofran and Tigan PRN for nausea   · Clear liquid diet continue as tolerated with ensures    Nausea and vomiting  Assessment & Plan  · Zofran 4 mg q6 and Tigan 200 mg q6 PRN     Iron deficiency  Assessment & Plan  · Continue with Venofer 200 mg daily     Metastatic disease ([address] )  Assessment & Plan  -see Gastric Mass A&P     Iron deficiency anemia  Assessment & Plan  Patient presenting with Hg 9 0 and MCV 74; Iron 20, TIBC 441, and Iron Sat 5  Likely iron-deficiency anemia in setting of GI blood loss 2/2 gastric mass    · Hb improving  · patient reported an episode of coffee-ground emesis previously   No longer having episodes currently  · Continue Venofer 200 mg daily  · On IV Protonix 40 mg daily over concern for GI bleed   · Gastroenterology consulted for potential EGD, appreciate recommendations  · Plan for EGD on   · Trend CBC  · Transfuse if Hgb <7      Disposition: Heme-onc, pending reccs  GI plan for EGD with biopsy on  and IR plan for liver biopsy on Tuesday 1/10    SUBJECTIVE     Patient seen and examined, sitting in bed  No acute events overnight  Patient denies any fevers, chills, chest pain, SOB, lightheadedness, dizziness  Endorses right upper quadrant abdominal pain and some nausea  No episodes of emesis overnight and this morning  OBJECTIVE     Vitals:    23 2209 23 0100 23 0805 23 1434   BP: 128/75  129/75 129/65   Pulse: 61  71    Resp: 16  16 18   Temp: 97 9 °F (36 6 °C)  97 9 °F (36 6 °C) 97 7 °F (36 5 °C)   SpO2: 96%  96%    Weight:  79 4 kg (175 lb)     Height:  5' 6" (1 676 m)        Temperature:   Temp (24hrs), Av 8 °F (36 6 °C), Min:97 7 °F (36 5 °C), Max:97 9 °F (36 6 °C)    Temperature: 97 7 °F (36 5 °C)  Intake & Output:  I/O        07 07 07 07    I V  (mL/kg)  446 7 (5 6)    IV Piggyback  200    Total Intake(mL/kg)  646 7 (8 1)    Net  +646 7          Unmeasured Urine Occurrence  2 x        Weights:   IBW (Ideal Body Weight): 63 8 kg    Body mass index is 28 25 kg/m²  Weight (last 2 days)     Date/Time Weight    23 0100 79 4 (175)        Physical Exam  Vitals and nursing note reviewed  Constitutional:       General: He is not in acute distress  Appearance: He is not toxic-appearing or diaphoretic  HENT:      Head: Normocephalic and atraumatic  Mouth/Throat:      Mouth: Mucous membranes are dry  Pharynx: Oropharynx is clear  Eyes:      General: No scleral icterus  Right eye: No discharge  Left eye: No discharge  Extraocular Movements: Extraocular movements intact        Conjunctiva/sclera: Conjunctivae normal    Cardiovascular: Rate and Rhythm: Normal rate and regular rhythm  Pulses: Normal pulses  Heart sounds: Normal heart sounds  No murmur heard  No gallop  Pulmonary:      Effort: Pulmonary effort is normal  No respiratory distress  Breath sounds: Normal breath sounds  No wheezing or rales  Abdominal:      General: Bowel sounds are normal  There is distension (fullness on right upper quadrant)  Tenderness: There is no guarding or rebound  Comments: Pain on palpation of RUQ   Musculoskeletal:         General: No swelling  Right lower leg: No edema  Left lower leg: No edema  Skin:     General: Skin is warm and dry  Capillary Refill: Capillary refill takes less than 2 seconds  Neurological:      Mental Status: He is alert and oriented to person, place, and time  LABORATORY DATA     Labs: I have personally reviewed pertinent reports  Results from last 7 days   Lab Units 01/07/23  1033 01/06/23  0512 01/04/23  2328   WBC Thousand/uL 7 63 8 34 9 91   HEMOGLOBIN g/dL 8 2* 7 9* 9 0*   HEMATOCRIT % 26 2* 24 6* 28 0*   PLATELETS Thousands/uL 456* 407* 490*   NEUTROS PCT % 76*  --  66   MONOS PCT % 9  --  9      Results from last 7 days   Lab Units 01/07/23  1033 01/06/23  0512 01/04/23  2328   POTASSIUM mmol/L 3 9 4 2 4 3   CHLORIDE mmol/L 104 101 100   CO2 mmol/L 26 28 27   BUN mg/dL 14 12 15   CREATININE mg/dL 0 80 0 71 0 76   CALCIUM mg/dL 8 7 8 8 8 8   ALK PHOS U/L 430* 405* 496*   ALT U/L 43 40 52   AST U/L 129* 48* 58*     Results from last 7 days   Lab Units 01/06/23  0512   MAGNESIUM mg/dL 2 2                        IMAGING & DIAGNOSTIC TESTING     Radiology Results: I have personally reviewed pertinent reports  No results found  Other Diagnostic Testing: I have personally reviewed pertinent reports      ACTIVE MEDICATIONS     Current Facility-Administered Medications   Medication Dose Route Frequency   • acetaminophen (TYLENOL) tablet 975 mg  975 mg Oral Q6H PRN   • enoxaparin (LOVENOX) subcutaneous injection 40 mg  40 mg Subcutaneous Q24H [facility]   • iron sucrose (VENOFER) 200 mg in sodium chloride 0 9 % 100 mL IVPB  200 mg Intravenous Daily   • morphine injection 4 mg  4 mg Intravenous Q3H PRN   • ondansetron (ZOFRAN) injection 4 mg  4 mg Intravenous Q6H PRN   • oxyCODONE (ROXICODONE) IR tablet 5 mg  5 mg Oral Q6H PRN   • pantoprazole (PROTONIX) injection 40 mg  40 mg Intravenous Q24H [facility]   • polyethylene glycol (MIRALAX) packet 17 g  17 g Oral Daily   • traMADol (ULTRAM) tablet 50 mg  50 mg Oral Q6H PRN   • trimethobenzamide (TIGAN) IM injection 200 mg  200 mg Intramuscular Q6H PRN       VTE Pharmacologic Prophylaxis: Enoxaparin (Lovenox)  VTE Mechanical Prophylaxis: sequential compression device    Portions of the record may have been created with voice recognition software  Occasional wrong word or "sound a like" substitutions may have occurred due to the inherent limitations of voice recognition software    Read the chart carefully and recognize, using context, where substitutions have occurred   ==  [address]  Internal Medicine Residency PGY-1

## 2023-01-09 ENCOUNTER — APPOINTMENT (INPATIENT)
Dept: GASTROENTEROLOGY | Facility: HOSPITAL | Age: 38
End: 2023-01-09

## 2023-01-09 ENCOUNTER — ANESTHESIA (INPATIENT)
Dept: GASTROENTEROLOGY | Facility: HOSPITAL | Age: 38
End: 2023-01-09

## 2023-01-09 ENCOUNTER — ANESTHESIA EVENT (OUTPATIENT)
Dept: ANESTHESIOLOGY | Facility: HOSPITAL | Age: 38
End: 2023-01-09

## 2023-01-09 ENCOUNTER — ANESTHESIA (OUTPATIENT)
Dept: ANESTHESIOLOGY | Facility: HOSPITAL | Age: 38
End: 2023-01-09

## 2023-01-09 ENCOUNTER — ANESTHESIA EVENT (INPATIENT)
Dept: GASTROENTEROLOGY | Facility: HOSPITAL | Age: 38
End: 2023-01-09

## 2023-01-09 VITALS
OXYGEN SATURATION: 96 % | TEMPERATURE: 98.2 F | SYSTOLIC BLOOD PRESSURE: 114 MMHG | HEIGHT: 66 IN | HEART RATE: 58 BPM | DIASTOLIC BLOOD PRESSURE: 63 MMHG | RESPIRATION RATE: 18 BRPM | WEIGHT: 175 LBS | BODY MASS INDEX: 28.12 KG/M2

## 2023-01-09 LAB
ANION GAP SERPL CALCULATED.3IONS-SCNC: 7 MMOL/L (ref 4–13)
BASOPHILS # BLD AUTO: 0.04 THOUSANDS/ÂΜL (ref 0–0.1)
BASOPHILS NFR BLD AUTO: 1 % (ref 0–1)
BUN SERPL-MCNC: 10 MG/DL (ref 5–25)
CALCIUM SERPL-MCNC: 8.9 MG/DL (ref 8.3–10.1)
CHLORIDE SERPL-SCNC: 105 MMOL/L (ref 96–108)
CO2 SERPL-SCNC: 27 MMOL/L (ref 21–32)
CREAT SERPL-MCNC: 0.73 MG/DL (ref 0.6–1.3)
EOSINOPHIL # BLD AUTO: 0.25 THOUSAND/ÂΜL (ref 0–0.61)
EOSINOPHIL NFR BLD AUTO: 3 % (ref 0–6)
ERYTHROCYTE [DISTWIDTH] IN BLOOD BY AUTOMATED COUNT: 14.1 % (ref 11.6–15.1)
GFR SERPL CREATININE-BSD FRML MDRD: 116 ML/MIN/1.73SQ M
GLUCOSE SERPL-MCNC: 87 MG/DL (ref 65–140)
HCT VFR BLD AUTO: 26.6 % (ref 36.5–49.3)
HGB BLD-MCNC: 8.4 G/DL (ref 12–17)
IMM GRANULOCYTES # BLD AUTO: 0.02 THOUSAND/UL (ref 0–0.2)
IMM GRANULOCYTES NFR BLD AUTO: 0 % (ref 0–2)
LYMPHOCYTES # BLD AUTO: 1.34 THOUSANDS/ÂΜL (ref 0.6–4.47)
LYMPHOCYTES NFR BLD AUTO: 17 % (ref 14–44)
MCH RBC QN AUTO: 23.3 PG (ref 26.8–34.3)
MCHC RBC AUTO-ENTMCNC: 31.6 G/DL (ref 31.4–37.4)
MCV RBC AUTO: 74 FL (ref 82–98)
MONOCYTES # BLD AUTO: 0.77 THOUSAND/ÂΜL (ref 0.17–1.22)
MONOCYTES NFR BLD AUTO: 10 % (ref 4–12)
NEUTROPHILS # BLD AUTO: 5.32 THOUSANDS/ÂΜL (ref 1.85–7.62)
NEUTS SEG NFR BLD AUTO: 69 % (ref 43–75)
NRBC BLD AUTO-RTO: 0 /100 WBCS
PLATELET # BLD AUTO: 457 THOUSANDS/UL (ref 149–390)
PMV BLD AUTO: 9.1 FL (ref 8.9–12.7)
POTASSIUM SERPL-SCNC: 3.7 MMOL/L (ref 3.5–5.3)
RBC # BLD AUTO: 3.6 MILLION/UL (ref 3.88–5.62)
SODIUM SERPL-SCNC: 139 MMOL/L (ref 135–147)
WBC # BLD AUTO: 7.74 THOUSAND/UL (ref 4.31–10.16)

## 2023-01-09 PROCEDURE — 0FB24ZX EXCISION OF LEFT LOBE LIVER, PERCUTANEOUS ENDOSCOPIC APPROACH, DIAGNOSTIC: ICD-10-PCS | Performed by: INTERNAL MEDICINE

## 2023-01-09 PROCEDURE — 0DB68ZX EXCISION OF STOMACH, VIA NATURAL OR ARTIFICIAL OPENING ENDOSCOPIC, DIAGNOSTIC: ICD-10-PCS | Performed by: INTERNAL MEDICINE

## 2023-01-09 PROCEDURE — 07BD4ZX EXCISION OF AORTIC LYMPHATIC, PERCUTANEOUS ENDOSCOPIC APPROACH, DIAGNOSTIC: ICD-10-PCS | Performed by: INTERNAL MEDICINE

## 2023-01-09 RX ORDER — PROPOFOL 10 MG/ML
INJECTION, EMULSION INTRAVENOUS AS NEEDED
Status: DISCONTINUED | OUTPATIENT
Start: 2023-01-09 | End: 2023-01-09

## 2023-01-09 RX ORDER — SODIUM CHLORIDE 9 MG/ML
INJECTION, SOLUTION INTRAVENOUS CONTINUOUS PRN
Status: DISCONTINUED | OUTPATIENT
Start: 2023-01-09 | End: 2023-01-09

## 2023-01-09 RX ORDER — ENOXAPARIN SODIUM 100 MG/ML
40 INJECTION SUBCUTANEOUS
Status: DISCONTINUED | OUTPATIENT
Start: 2023-01-10 | End: 2023-01-09 | Stop reason: SDUPTHER

## 2023-01-09 RX ORDER — PANTOPRAZOLE SODIUM 40 MG/1
40 TABLET, DELAYED RELEASE ORAL
Status: DISCONTINUED | OUTPATIENT
Start: 2023-01-09 | End: 2023-01-10 | Stop reason: HOSPADM

## 2023-01-09 RX ORDER — LIDOCAINE HYDROCHLORIDE 10 MG/ML
INJECTION, SOLUTION EPIDURAL; INFILTRATION; INTRACAUDAL; PERINEURAL AS NEEDED
Status: DISCONTINUED | OUTPATIENT
Start: 2023-01-09 | End: 2023-01-09

## 2023-01-09 RX ORDER — PROPOFOL 10 MG/ML
INJECTION, EMULSION INTRAVENOUS CONTINUOUS PRN
Status: DISCONTINUED | OUTPATIENT
Start: 2023-01-09 | End: 2023-01-09

## 2023-01-09 RX ADMIN — PROPOFOL 80 MG: 10 INJECTION, EMULSION INTRAVENOUS at 10:34

## 2023-01-09 RX ADMIN — SODIUM CHLORIDE: 0.9 INJECTION, SOLUTION INTRAVENOUS at 10:26

## 2023-01-09 RX ADMIN — PROPOFOL 20 MG: 10 INJECTION, EMULSION INTRAVENOUS at 11:21

## 2023-01-09 RX ADMIN — PANTOPRAZOLE SODIUM 40 MG: 40 TABLET, DELAYED RELEASE ORAL at 15:50

## 2023-01-09 RX ADMIN — PROPOFOL 50 MG: 10 INJECTION, EMULSION INTRAVENOUS at 10:36

## 2023-01-09 RX ADMIN — PANTOPRAZOLE SODIUM 40 MG: 40 INJECTION, POWDER, FOR SOLUTION INTRAVENOUS at 09:02

## 2023-01-09 RX ADMIN — LIDOCAINE HYDROCHLORIDE 100 MG: 10 INJECTION, SOLUTION EPIDURAL; INFILTRATION; INTRACAUDAL at 10:34

## 2023-01-09 RX ADMIN — PROPOFOL 130 MCG/KG/MIN: 10 INJECTION, EMULSION INTRAVENOUS at 10:55

## 2023-01-09 RX ADMIN — PROPOFOL 20 MG: 10 INJECTION, EMULSION INTRAVENOUS at 10:39

## 2023-01-09 RX ADMIN — PROPOFOL 150 MCG/KG/MIN: 10 INJECTION, EMULSION INTRAVENOUS at 10:37

## 2023-01-09 RX ADMIN — PROPOFOL 40 MG: 10 INJECTION, EMULSION INTRAVENOUS at 10:35

## 2023-01-09 NOTE — OCCUPATIONAL THERAPY NOTE
Occupational Therapy Evaluation     Patient Name: Marcus Marin  XRQBT'S Date: 1/9/2023  Problem List  Principal Problem:    Gastric mass  Active Problems:    Iron deficiency anemia    Metastatic disease (HCC)    Nausea and vomiting    Past Medical History  No past medical history on file  Past Surgical History  No past surgical history on file  01/09/23 5004   OT Last Visit   OT Visit Date 01/09/23   Note Type   Note type Evaluation   Pain Assessment   Pain Assessment Tool 0-10   Pain Score 3   Pain Location/Orientation Location: Abdomen;Orientation: Right   Restrictions/Precautions   Weight Bearing Precautions Per Order No   Other Precautions Fall Risk;Pain   Home Living   Type of [address] [place] [address] Multi-level;Bed/bath upstairs  (0STE)   Bathroom Shower/Tub Walk-in shower   Bathroom Toilet Standard   Bathroom Equipment Other (Comment)  (none)   Home Equipment Other (Comment)  (no DME)   Additional Comments Per CM, pt lives in a 3  and rents the 2nd floor to his brother  0 KING and FF of steps inside  Prior Function   Level of [place] Independent with ADLs; Independent with IADLS; Independent with functional mobility   Lives With [facility] Help From Family   IADLs Independent with meal prep; Independent with medication management; Family/Friend/Other provides transportation   Falls in the last 6 months 0   Vocational Unemployed   Lifestyle   Autonomy Per CM, pt was I in ADLs, IADLs, and does not drive or use DME PTA  Reciprocal Relationships Pt rents out the 2nd floor of his home to his brother  Pt states his brother can A if needed  Service to Others Pt is unemployed since becoming sick  Intrinsic Gratification Pt likes to be with his family     Subjective   Subjective "Pain is a 3"   ADL   Where Assessed Edge of bed   Eating Assistance 5  Supervision/Setup   Grooming Assistance 5  Supervision/Setup   UB Bathing Assistance 5  Supervision/Setup   LB Bathing Assistance 5 Supervision/Setup   UB Dressing Assistance 5  Supervision/Setup   LB Dressing Assistance 5  Supervision/Setup   Toileting Assistance  5  Supervision/Setup   Functional Assistance 5  Supervision/Setup   Bed Mobility   Supine to Sit 5  Supervision   Sit to Supine Unable to assess   Additional Comments Pt was supine in bed upon arrival  Pt was left sitting in chair with all necessary items within reach at the end of session  Transfers   Sit to Stand 5  Supervision   Additional items Increased time required   Stand to Sit 5  Supervision   Additional items Increased time required   Additional Comments Pt transfers with no AD  Functional Mobility   Functional Mobility 5  Supervision   Additional Comments Pt was able to demonstrate household mobility with no AD  Balance   Static Sitting Good   Dynamic Sitting Fair +   Static Standing Fair +   Dynamic Standing Fair   Ambulatory Fair -   Activity Tolerance   Activity Tolerance Patient tolerated treatment well;Patient limited by pain   Medical Staff Made Aware PT Christine Stewart   Nurse Made Aware RN made aware   RUE Assessment   RUE Assessment WFL   LUE Assessment   LUE Assessment WFL   Hand Function   Gross Motor Coordination Functional   Fine Motor Coordination Functional   Psychosocial   Psychosocial (WDL) WDL   Cognition   Overall Cognitive Status WFL   Arousal/Participation Alert; Responsive;Arousable; Cooperative   Attention Within functional limits   Orientation Level Oriented X4   Memory Within functional limits   Following Commands Follows one step commands with increased time or repetition   Comments Pt was pleasant and cooperative t/o session  Attempted to use blue phone 2* patient primarily speaking [place]  Used  on phone instead  Assessment   Prognosis Fair   Assessment Pt is 42 y o  male admitted to [facility] on 1/6/2023 w/ gastric mass  Pt  has no past medical history on file  Pt with active OT orders and activity orders   Pt resides in a 3 story Home, with 0 KING  Pt rents out the 2nd floor of his home to his brother  Pt states that his brother can A if needed  Pt was I w/ ADLs, IADLs, (-) drove  Pt is currently functioning at S for UB and LB ADLs, transfers, bed mobility, and functional mobility  Pt does not require further acute OT services while in the hospital  Based off of an OT evaulation, assessment, and performance functioning, pt is identified as a moderate complexity  The patient's raw score on the AM-PAC Daily Activity inpatient short form is 24, standardized score is 57 54, greater than 39 4  Patients at this level are likely to benefit from discharge to home  Please refer to the recommendation of the Occupational Therapist for safe discharge planning  OT recommendation for d/c includes Home with increased social support  Pt would benefit from practice with ADLs and functional mobility with staff until d/c  Goals   Patient Goals to be out of pain   Recommendation   OT Discharge Recommendation No rehabilitation needs  (home with increased social support)   AM-PAC Daily Activity Inpatient   Lower Body Dressing 4   Bathing 4   Toileting 4   Upper Body Dressing 4   Grooming 4   Eating 4   Daily Activity Raw Score 24   Daily Activity Standardized Score (Calc for Raw Score >=11) 57 54   AM-PAC Applied Cognition Inpatient   Following a Speech/Presentation 4   Understanding Ordinary Conversation 4   Taking Medications 4   Remembering Where Things Are Placed or Put Away 4   Remembering List of 4-5 Errands 4   Taking Care of Complicated Tasks 4   Applied Cognition Raw Score 24   Applied Cognition Standardized Score 62 21   End of Consult   Education Provided Yes   Patient Position at End of Consult Bedside chair;Bed/Chair alarm activated; All needs within reach   Nurse Communication Nurse aware of consult   End of Consult Comments Pt was left sitting in the chair with all necessary items within reach         MASHA Recinos/GEOVANY

## 2023-01-09 NOTE — PHYSICAL THERAPY NOTE
Physical Therapy Evaluation     Patient's Name: Juan Manuel Henry    Admitting Diagnosis  Gastric cancer [facility]) [C16 9]    Problem List  Patient Active Problem List   Diagnosis    Iron deficiency anemia    Gastric mass    Metastatic disease ([address] )    Nausea and vomiting       Past Medical History  No past medical history on file  Past Surgical History  No past surgical history on file  01/09/23 1355   PT Last Visit   PT Visit Date 01/09/23   Note Type   Note type Evaluation   Pain Assessment   Pain Assessment Tool 0-10   Pain Score 3   Pain Location/Orientation Location: Abdomen   Hospital Pain Intervention(s) Repositioned; Ambulation/increased activity; Emotional support   Restrictions/Precautions   Weight Bearing Precautions Per Order No   Other Precautions Multiple lines; Fall Risk;Pain   Home Living   Type of [address] [place] [address] Multi-level;Bed/bath upstairs  (0 KING)   Bathroom Shower/Tub Walk-in shower   Bathroom Toilet Standard   Bathroom Equipment   (denies)   Home Equipment   (denies)   Additional Comments Pt primarily Vietnamese speaking, google translate utlized throughout session  Per CM note, pt lives in [address] [place] [address] with 0 KING, rents 2nd floor of brothers house  Prior Function   Level of [place] Independent with ADLs; Independent with functional mobility; Independent with IADLS   Lives With [facility] Help From Family   IADLs Independent with meal prep; Independent with medication management; Family/Friend/Other provides transportation   Falls in the last 6 months 0   Vocational Unemployed   General   Family/Caregiver Present No   Cognition   Overall Cognitive Status WFL   Attention Within functional limits   Orientation Level Oriented X4   Memory Within functional limits   Following Commands Follows one step commands with increased time or repetition   Comments pt pleasant and cooperaitve, google translate utilized throughout session as pt primarily Vietnamese speaking (blue phone attempted however unsuccessful)   RLE Assessment   RLE Assessment WFL   LLE Assessment   LLE Assessment WFL   Bed Mobility   Supine to Sit 5  Supervision   Additional items HOB elevated; Bedrails; Increased time required   Sit to Supine Unable to assess   Additional Comments pt supine in bed upon arrival  Pt returned to sitting OOB in recliner with all needs wihtin reach   Transfers   Sit to Stand 5  Supervision   Additional items Increased time required   Stand to Sit 5  Supervision   Additional items Increased time required   Additional Comments transfers without AD   Ambulation/Elevation   Gait pattern Excessively slow; Short stride   Gait Assistance 5  Supervision   Assistive Device None   Distance 150ft   Stair Management Assistance 5  Supervision   Stair Management Technique One rail R;Alternating pattern; Foreward   Number of Stairs 3  (limited by lines)   Balance   Static Sitting Fair +   Dynamic Sitting Fair +   Static Standing Fair   Dynamic Standing Fair   Ambulatory Fair   Activity Tolerance   Activity Tolerance Patient tolerated treatment well   Medical Staff Made Aware OT; co-eval performed this date 2* increased medical complexity and multiple co-morbidities   Nurse Made Aware RN cleared pt to participate in therapy session   Assessment   Prognosis Fair   Assessment Pt seen for moderate complexity PT evaluation  Pt with active PT eval/treat orders  Pt is a 42 y o  male who was admitted to [facility] on 1/6/2023 with gastric mass s/p EGD  Pt's active problems include: anemia, metastatic disease, nausea/ vomiting, abdominal pain  Pt  has no past medical history on file  Pt resides with brother in [address] [place] [address] with 0 KING and was independent prior to hospital admission  Currently upon evaluation pt performing bed mobility tasks at S level, funational transfers at S level and ambulation at S level without AD  Pt was left sitting OOB in recliner at the end of PT session with all needs within reach   Pt with no questions or concerns regarding d/c home; appears to be functioning at/ near baseline mobility levels  Pt with no further acute inpatient PT needs at this time- please re-consult if needed  PT to d/c pt and recommends home with family support  Encourage pt to ambulate at least 3-4x/day with restorative/ nursing staff while remaining in hospital  The patient's [address] [place] [address] Mobility Inpatient Short Form Raw Score is 18  A Raw score of greater than 16 suggests the patient may benefit from discharge to home  Please also refer to the recommendation of the Physical Therapist for safe discharge planning  Goals   Patient Goals to sit in recliner   Plan   Treatment/Interventions Functional transfer training;LE strengthening/ROM; Elevations; Patient/family training;Bed mobility;Gait training;Spoke to nursing;Spoke to case management;OT   PT Frequency   (eval only- d/c PT)   Recommendation   PT Discharge Recommendation No rehabilitation needs   AM-PAC Basic Mobility Inpatient   Turning in Flat Bed Without Bedrails 3   Lying on Back to Sitting on Edge of Flat Bed Without Bedrails 3   Moving Bed to Chair 3   Standing Up From Chair Using Arms 3   Walk in Room 3   Climb 3-5 Stairs With Railing 3   Basic Mobility Inpatient Raw Score 18   Basic Mobility Standardized Score 41 05   Highest Level Of Mobility   JH-HLM Goal 6: Walk 10 steps or more   JH-HLM Achieved 7: Walk 25 feet or more         Marlen Feliciano, PT DPT

## 2023-01-09 NOTE — ANESTHESIA POSTPROCEDURE EVALUATION
Post-Op Assessment Note    CV Status:  Stable  Pain Score: 0    Pain management: adequate     Mental Status:  Sleepy and arousable   Hydration Status:  Euvolemic   PONV Controlled:  Controlled   Airway Patency:  Patent      Post Op Vitals Reviewed: Yes      Staff: CRNA         No notable events documented      BP   98/57   Temp      Pulse  68   Resp   20   SpO2   97 ra

## 2023-01-09 NOTE — QUICK NOTE
Liver bx was obtained by GI during EGD today  Will cancel bx for IR tomorrow      Astridshivam Schaefer PA-C

## 2023-01-09 NOTE — QUICK NOTE
Will plan for US guided liver biopsy Tuesday 1/10  Please keep npo after midnight      Please hold lovenox in AM     Wiliam Zamora PA-C

## 2023-01-09 NOTE — CASE MANAGEMENT
Case Management Assessment & Discharge Planning Note    Patient name Juan Manuel Henry  Location [facility] 919/[facility] 904-65 MRN [id number]  : 1985 Date 2023       Current Admission Date: 2023  Current Admission Diagnosis:Gastric mass   Patient Active Problem List    Diagnosis Date Noted   • Nausea and vomiting 2023   • Iron deficiency anemia 2023   • Gastric mass 2023   • Metastatic disease ([address] ) 2023      LOS (days): 3  Geometric Mean LOS (GMLOS) (days):   Days to GMLOS:     OBJECTIVE:    Risk of Unplanned Readmission Score: 10 26         Current admission status: Inpatient       Preferred Pharmacy:   [facility], [address] [facility] [address] [address] [address]  Phone: [phone] Fax: [phone]    Primary Care Provider: No primary care provider on file  Primary Insurance: JAMESON MEDEIROS PENDING  Secondary Insurance:     ASSESSMENT:  Active Health Care Proxies    There are no active Health Care Proxies on file  Patient Information  Admitted from[de-identified] Home  Mental Status: Alert  During Assessment patient was accompanied by: Not accompanied during assessment  Assessment information provided by[de-identified] Patient (using  Rebekah Valentin with ID number [id number])  Primary Caregiver: Self  Support Systems: Self, Family members  What city do you live in?: [place] [address] entry access options   Select all that apply : No steps to enter home  Type of Current Residence: 2 story home (pt states he rents the top floor in his brothers home)  Upon entering residence, is there a bedroom on the main floor (no further steps)?: No  A bedroom is located on the following floor levels of residence (select all that apply):: 2nd Floor  Upon entering residence, is there a bathroom on the main floor (no further steps)?: No  Indicate which floors of current residence have a bathroom (select all the apply):: 2nd Floor  Number of steps to 2nd floor from main floor: One Flight  In the last 12 months, was there a time when you were not able to pay the mortgage or rent on time?: No  In the last 12 months, was there a time when you did not have a steady place to sleep or slept in a shelter (including now)?: No  Living Arrangements: Other (Comment) (lives with brother (rents room on top floor))    Activities of Daily Living Prior to Admission  Functional Status: Independent  Completes ADLs independently?: Yes  Ambulates independently?: Yes  Does patient use assisted devices?: No  Does patient currently own DME?: No  Does patient have a history of Outpatient Therapy (PT/OT)?: No  Does the patient have a history of Short-Term Rehab?: No  Does patient have a history of HHC?: No  [facility]         Patient Information Continued  Income Source: Unemployed (pt was working, but when he got sick, he stopped working)  Does patient have prescription coverage?: No  Within the past 12 months, you worried that your food would run out before you got the money to buy more : Never true  Within the past 12 months, the food you bought just didn't last and you didn't have money to get more : Never true  Does patient receive dialysis treatments?: No  Does patient have a history of substance abuse?: No  Does patient have a history of Mental Health Diagnosis?: No         Means of Transportation  [facility]] Family transport (pt states that he does not know how to drive )  In the past 12 months, has lack of transportation kept you from medical appointments or from getting medications?: No  In the past 12 months, has lack of transportation kept you from meetings, work, or from getting things needed for daily living?: No        DISCHARGE DETAILS:    Discharge planning discussed with[de-identified] Patient  Freedom of Choice: Yes        Were Treatment Team discharge recommendations reviewed with patient/caregiver?: Yes  Did patient/caregiver verbalize understanding of patient care needs?: Yes  Were patient/caregiver advised of the risks associated with not following Treatment Team discharge recommendations?: Yes      Other Referral/Resources/Interventions Provided:  Referral Comments: awaiting recommendations - patient states that he is currently staying with his brother who is very helpful and supportive  He does however state that the rest of his family is currently in [facility] including his other brothers, his wife, and his two children (ages 15 and 15)  He states that his goal is to be able to go home with them  VENKATESH explained to the patient that if the plan is for the patient to fly to [facility] he should really get clearance to fly by the MD unless the plan is for him to drive to [facility] from here  Patient understanding at this time and grateful for what everyone is doing for him  Additional Comments: Patient confirms that at this time he is NOT COVID vaccinated

## 2023-01-09 NOTE — PROGRESS NOTES
INTERNAL MEDICINE RESIDENCY PROGRESS NOTE     Name: Petra Gu   Age & Sex: 42 y o  male   MRN: [id number]  Unit/Bed#: [facility] 919-01   Encounter: [id number]  Team: SOD Team A    PATIENT INFORMATION     Name: Petra Gu   Age & Sex: 42 y o  male   MRN: [id number]  Hospital Stay Days: 3    ASSESSMENT/PLAN     Principal Problem:    Gastric mass  Active Problems:    Iron deficiency anemia    Metastatic disease (HCC)    Nausea and vomiting      Nausea and vomiting  Assessment & Plan  Denies nausea and vomiting today  Plan:  -Zofran 4 mg q6h PRN  -Tigan 200 mg q6h PRN     Metastatic disease (HCC)  Assessment & Plan  -see Gastric Mass A&P     Iron deficiency anemia  Assessment & Plan  Patient presenting with Hg 9 0 and MCV 74; Iron 20, TIBC 441, and Iron Sat 5  Likely iron-deficiency anemia in setting of GI blood loss 2/2 gastric mass  Hb improving  Patient reported an episode of coffee-ground emesis previously  No longer having episodes currently  Plan:  -Continue Venofer 200 mg daily  -Continue IV Protonix 40 mg daily over concern for GI bleed   -Gastroenterology consulted for potential EGD, appreciate recommendations  -Plan for EGD today, 1/9  · Trend CBC  · Transfuse if Hgb <7    * Gastric mass  Assessment & Plan  Patient presenting with 2 wk hx of RUQ post-prandial pain found to have gastric antral mass as well as potential mets to liver, omentum, and lung    Plan:  -Consult GI for EGD biopsy, appreciate recommendations  -Plan for EGD today 1/9, NPO midnight  -IR consulted for liver biopsy, possibly Tuesday 1/10  -Consulted heme onc, appreciate recs:  -"Suspect gastric malignancy with stage IV disease and extensive metastases to abdominal cavity, omentum, liver and likely in the lungs  Microcytic anemia secondary to chronic GI blood loss resulting in iron deficiency and also compounded by anemia of chronic disease and that is why ferritin is not that low, 140   Iron saturation 5% "  -Plan "to reevaluate after the biopsies  Genetic testing because of gastric malignancy at a young age "  -Ultimately patient would like to return home to [facility] for his final days  -Hospice appropriate at this time given poor prognosis  -Hepatitis panel and HIV negative  -Continue IV Protonix 40 mg over concern for GI bleeding  -Continue DVT ppx for now, but low threshold to stop if acute Hgb drop or any episode of hematemesis  -Tylenol 975 mg q6 PRN for mild pain, Tramadol 50 mg q6 PRN for moderate pain, Oxy 5 mg q6 PRN for severe pain, and Morphine 4mg q3 PRN for breakthrough pain  -Zofran and Tigan PRN for nausea   -Clear liquid diet continue as tolerated with ensures    Iron deficiency-resolved as of 2023  Assessment & Plan  · 2/2 to gi malignancy  · Continue with Venofer 200 mg daily     Disposition: Remain inpatient for EGD today, possible liver biopsy tomorrow  SUBJECTIVE     Patient seen and examined at bedside this morning  No acute events overnight  Endorses mild epigastric pain rated 3/10  Denies chest pain, SOB, N/V/D  Hebrew Interpretor used to obtain information  All questions answered and no concerns at this time      OBJECTIVE     Vitals:    23 0735 23 1011 23 1130 23 1145   BP: 116/68 117/67 98/57 105/69   Pulse: 57 60 68 58   Resp: 16 20 16 16   Temp:  97 9 °F (36 6 °C) (!) 97 °F (36 1 °C)    TempSrc:  Tympanic Tympanic    SpO2: 97% 97% 97% 96%   Weight:  79 4 kg (175 lb)     Height:  5' 6" (1 676 m)        Temperature:   Temp (24hrs), Av 7 °F (36 5 °C), Min:97 °F (36 1 °C), Max:98 1 °F (36 7 °C)    Temperature: (!) 97 °F (36 1 °C)  Intake & Output:  I/O        07 0700  07 07 0701  01/10 07    I V  (mL/kg) 446 7 (5 6)  500 (6 3)    IV Piggyback 200      Total Intake(mL/kg) 646 7 (8 1)  500 (6 3)    Net +646 7  +500           Unmeasured Urine Occurrence 5 x          Weights:   IBW (Ideal Body Weight): 63 8 kg    Body mass index is 28 25 kg/m²  Weight (last 2 days)     Date/Time Weight    01/09/23 1011 79 4 (175)    01/07/23 0100 79 4 (175)        Physical Exam  Vitals and nursing note reviewed  Constitutional:       General: He is not in acute distress  Appearance: Normal appearance  He is not ill-appearing or diaphoretic  HENT:      Head: Normocephalic and atraumatic  Nose: No congestion  Mouth/Throat:      Pharynx: Oropharynx is clear  Eyes:      General: No scleral icterus  Conjunctiva/sclera: Conjunctivae normal    Cardiovascular:      Rate and Rhythm: Normal rate and regular rhythm  Pulses: Normal pulses  Heart sounds: Normal heart sounds  No murmur heard  Pulmonary:      Effort: Pulmonary effort is normal  No respiratory distress  Breath sounds: Normal breath sounds  No wheezing  Abdominal:      General: Bowel sounds are normal  There is no distension  Palpations: Abdomen is soft  Tenderness: There is abdominal tenderness  Musculoskeletal:         General: No swelling  Normal range of motion  Cervical back: Normal range of motion  Right lower leg: No edema  Left lower leg: No edema  Skin:     General: Skin is warm  Capillary Refill: Capillary refill takes less than 2 seconds  Coloration: Skin is not jaundiced  Neurological:      General: No focal deficit present  Mental Status: He is alert and oriented to person, place, and time  Psychiatric:         Attention and Perception: Attention normal          Mood and Affect: Mood is depressed  Speech: Speech normal          Behavior: Behavior is cooperative  LABORATORY DATA     Labs: I have personally reviewed pertinent reports    Results from last 7 days   Lab Units 01/09/23  0604 01/08/23  0539 01/07/23  1033 01/06/23  0512 01/04/23  2328   WBC Thousand/uL 7 74 7 28 7 63   < > 9 91   HEMOGLOBIN g/dL 8 4* 8 2* 8 2*   < > 9 0*   HEMATOCRIT % 26 6* 27 3* 26 2*   < > 28 0* PLATELETS Thousands/uL 457* 460* 456*   < > 490*   NEUTROS PCT % 69  --  76*  --  66   MONOS PCT % 10  --  9  --  9    < > = values in this interval not displayed  Results from last 7 days   Lab Units 01/09/23  0604 01/08/23  0539 01/07/23  1033 01/06/23  0512   POTASSIUM mmol/L 3 7 4 1 3 9 4 2   CHLORIDE mmol/L 105 104 104 101   CO2 mmol/L 27 28 26 28   BUN mg/dL 10 13 14 12   CREATININE mg/dL 0 73 0 74 0 80 0 71   CALCIUM mg/dL 8 9 8 5 8 7 8 8   ALK PHOS U/L  --  389* 430* 405*   ALT U/L  --  41 43 40   AST U/L  --  142* 129* 48*     Results from last 7 days   Lab Units 01/06/23  0512   MAGNESIUM mg/dL 2 2                        IMAGING & DIAGNOSTIC TESTING     Radiology Results: I have personally reviewed pertinent reports  Endoscopic ultrasonography, GI (Upper)    Result Date: 1/9/2023  Impression: Large gastric mass mainly at the antrum concerning for malignancy, cold forceps biopsies obtained During EUS there was extensive lymphadenopathy at the celiac region, one LN was biopsied, single biopsy obtained due to scope malfunction, core of tissue obtained Liver lesion concerning for metastasis seen at the L lobe, single biopsy obtained due to scope malfunction, core of tissue obtained RECOMMENDATION:  Await pathology results Can have clears today   Elliott Cameron MD     Other Diagnostic Testing: I have personally reviewed pertinent reports      ACTIVE MEDICATIONS     Current Facility-Administered Medications   Medication Dose Route Frequency   • acetaminophen (TYLENOL) tablet 975 mg  975 mg Oral Q6H PRN   • enoxaparin (LOVENOX) subcutaneous injection 40 mg  40 mg Subcutaneous Q24H BILLY   • morphine injection 4 mg  4 mg Intravenous Q3H PRN   • ondansetron (ZOFRAN) injection 4 mg  4 mg Intravenous Q6H PRN   • oxyCODONE (ROXICODONE) IR tablet 5 mg  5 mg Oral Q6H PRN   • pantoprazole (PROTONIX) EC tablet 40 mg  40 mg Oral BID AC   • polyethylene glycol (MIRALAX) packet 17 g  17 g Oral Daily   • traMADol Yolette Neve) tablet 50 mg  50 mg Oral Q6H PRN   • trimethobenzamide (TIGAN) IM injection 200 mg  200 mg Intramuscular Q6H PRN       VTE Pharmacologic Prophylaxis: Enoxaparin  VTE Mechanical Prophylaxis: sequential compression device    Portions of the record may have been created with voice recognition software  Occasional wrong word or "sound a like" substitutions may have occurred due to the inherent limitations of voice recognition software    Read the chart carefully and recognize, using context, where substitutions have occurred   ==  Elida Nolasco MD  [facility]  Internal Medicine Residency PGY-1

## 2023-01-10 ENCOUNTER — APPOINTMENT (OUTPATIENT)
Dept: RADIOLOGY | Facility: HOSPITAL | Age: 38
End: 2023-01-10

## 2023-01-10 PROBLEM — R11.2 NAUSEA AND VOMITING: Status: RESOLVED | Noted: 2023-01-06 | Resolved: 2023-01-10

## 2023-01-10 PROBLEM — C16.9 GASTRIC CANCER (HCC): Status: ACTIVE | Noted: 2023-01-10

## 2023-01-10 LAB
ALBUMIN SERPL BCP-MCNC: 2.5 G/DL (ref 3.5–5)
ALP SERPL-CCNC: 510 U/L (ref 46–116)
ALT SERPL W P-5'-P-CCNC: 80 U/L (ref 12–78)
ANION GAP SERPL CALCULATED.3IONS-SCNC: 6 MMOL/L (ref 4–13)
AST SERPL W P-5'-P-CCNC: 221 U/L (ref 5–45)
BASOPHILS # BLD AUTO: 0.03 THOUSANDS/ÂΜL (ref 0–0.1)
BASOPHILS NFR BLD AUTO: 0 % (ref 0–1)
BILIRUB SERPL-MCNC: 0.38 MG/DL (ref 0.2–1)
BUN SERPL-MCNC: 14 MG/DL (ref 5–25)
CALCIUM ALBUM COR SERPL-MCNC: 9.8 MG/DL (ref 8.3–10.1)
CALCIUM SERPL-MCNC: 8.6 MG/DL (ref 8.3–10.1)
CHLORIDE SERPL-SCNC: 108 MMOL/L (ref 96–108)
CO2 SERPL-SCNC: 26 MMOL/L (ref 21–32)
CREAT SERPL-MCNC: 0.88 MG/DL (ref 0.6–1.3)
EOSINOPHIL # BLD AUTO: 0.19 THOUSAND/ÂΜL (ref 0–0.61)
EOSINOPHIL NFR BLD AUTO: 2 % (ref 0–6)
ERYTHROCYTE [DISTWIDTH] IN BLOOD BY AUTOMATED COUNT: 14.6 % (ref 11.6–15.1)
GFR SERPL CREATININE-BSD FRML MDRD: 108 ML/MIN/1.73SQ M
GLUCOSE SERPL-MCNC: 91 MG/DL (ref 65–140)
HCT VFR BLD AUTO: 27.5 % (ref 36.5–49.3)
HGB BLD-MCNC: 8.5 G/DL (ref 12–17)
IMM GRANULOCYTES # BLD AUTO: 0.04 THOUSAND/UL (ref 0–0.2)
IMM GRANULOCYTES NFR BLD AUTO: 1 % (ref 0–2)
LYMPHOCYTES # BLD AUTO: 1.38 THOUSANDS/ÂΜL (ref 0.6–4.47)
LYMPHOCYTES NFR BLD AUTO: 17 % (ref 14–44)
MCH RBC QN AUTO: 23.4 PG (ref 26.8–34.3)
MCHC RBC AUTO-ENTMCNC: 30.9 G/DL (ref 31.4–37.4)
MCV RBC AUTO: 76 FL (ref 82–98)
MONOCYTES # BLD AUTO: 0.86 THOUSAND/ÂΜL (ref 0.17–1.22)
MONOCYTES NFR BLD AUTO: 11 % (ref 4–12)
NEUTROPHILS # BLD AUTO: 5.57 THOUSANDS/ÂΜL (ref 1.85–7.62)
NEUTS SEG NFR BLD AUTO: 69 % (ref 43–75)
NRBC BLD AUTO-RTO: 0 /100 WBCS
PLATELET # BLD AUTO: 453 THOUSANDS/UL (ref 149–390)
PMV BLD AUTO: 9.7 FL (ref 8.9–12.7)
POTASSIUM SERPL-SCNC: 3.8 MMOL/L (ref 3.5–5.3)
PROT SERPL-MCNC: 7.1 G/DL (ref 6.4–8.4)
RBC # BLD AUTO: 3.64 MILLION/UL (ref 3.88–5.62)
SODIUM SERPL-SCNC: 140 MMOL/L (ref 135–147)
WBC # BLD AUTO: 8.07 THOUSAND/UL (ref 4.31–10.16)

## 2023-01-10 RX ORDER — PANTOPRAZOLE SODIUM 40 MG/1
40 TABLET, DELAYED RELEASE ORAL
Qty: 60 TABLET | Refills: 0 | Status: SHIPPED | OUTPATIENT
Start: 2023-01-10

## 2023-01-10 RX ORDER — CYCLOBENZAPRINE HYDROCHLORIDE 7.5 MG/1
7.5 TABLET, FILM COATED ORAL 3 TIMES DAILY PRN
Qty: 21 TABLET | Refills: 0 | Status: SHIPPED | OUTPATIENT
Start: 2023-01-10

## 2023-01-10 RX ORDER — GABAPENTIN 100 MG/1
100 CAPSULE ORAL 3 TIMES DAILY PRN
Qty: 30 CAPSULE | Refills: 0 | Status: SHIPPED | OUTPATIENT
Start: 2023-01-10

## 2023-01-10 RX ORDER — ACETAMINOPHEN 325 MG/1
975 TABLET ORAL EVERY 6 HOURS PRN
Qty: 60 TABLET | Refills: 0 | Status: SHIPPED | OUTPATIENT
Start: 2023-01-10

## 2023-01-10 RX ORDER — ONDANSETRON HYDROCHLORIDE 8 MG/1
8 TABLET, FILM COATED ORAL EVERY 8 HOURS PRN
Qty: 30 TABLET | Refills: 0 | Status: SHIPPED | OUTPATIENT
Start: 2023-01-10

## 2023-01-10 RX ADMIN — ACETAMINOPHEN 975 MG: 325 TABLET, FILM COATED ORAL at 17:15

## 2023-01-10 RX ADMIN — POLYETHYLENE GLYCOL 3350 17 G: 17 POWDER, FOR SOLUTION ORAL at 10:10

## 2023-01-10 RX ADMIN — GADOBUTROL 7 ML: 604.72 INJECTION INTRAVENOUS at 01:08

## 2023-01-10 RX ADMIN — PANTOPRAZOLE SODIUM 40 MG: 40 TABLET, DELAYED RELEASE ORAL at 06:25

## 2023-01-10 RX ADMIN — PANTOPRAZOLE SODIUM 40 MG: 40 TABLET, DELAYED RELEASE ORAL at 17:15

## 2023-01-10 RX ADMIN — ENOXAPARIN SODIUM 40 MG: 40 INJECTION SUBCUTANEOUS at 10:10

## 2023-01-10 NOTE — DISCHARGE SUMMARY
INTERNAL MEDICINE RESIDENCY DISCHARGE SUMMARY     Cora Hendrix   42 y o  male  MRN: [id number]  Room/Bed: [facility] 91/[facility] 919-     [address] [address]   Encounter: [id number]    Principal Problem:    Gastric mass  Active Problems:    Iron deficiency anemia    Metastatic disease ([facility] )    Gastric cancer ([facility] )      Metastatic disease ([facility] )  Assessment & Plan  -see Gastric Mass A&P     Iron deficiency anemia  Assessment & Plan  Patient presenting with Hg 9 0 and MCV 74; Iron 20, TIBC 441, and Iron Sat 5  Likely iron-deficiency anemia in setting of GI blood loss 2/2 gastric mass  Hb improving  Patient reported an episode of coffee-ground emesis previously  No longer having episodes currently  Plan:  -Continue Venofer 200 mg daily  -Continue IV Protonix 40 mg daily over concern for GI bleed   -Gastroenterology consulted; EGD completed 1/9  -No overt signs of bleeding  · Trend CBC  · Transfuse if Hgb <7    * Gastric mass  Assessment & Plan  Patient presenting with 2 wk hx of RUQ post-prandial pain found to have gastric antral mass as well as potential mets to liver, omentum, and lung    Plan:  -GI consult: EGD/EUS completed 1/9 with biopsies of gastric mass and liver  Gastric mass (mainly involving antrum) biopsy confirmed gastric adenocarcinoma  During procedure, extensive lymphadenopathy seen in celiac region  Liver biopsy unrevealing  Lymph node FNA still pending at time of discharge  If FNA is unrevealing, patient needs targeted liver biopsy to confirm metastasis  -Consulted heme onc, appreciate recs: "Suspect gastric malignancy with stage IV disease and extensive metastases to abdominal cavity, omentum, liver and likely in the lungs  Microcytic anemia secondary to chronic GI blood loss resulting in iron deficiency and also compounded by anemia of chronic disease and that is why ferritin is not that low, 140  Iron saturation 5%   Plan to reevaluate after the biopsies  Genetic testing because of gastric malignancy at a young age "  -Hospice appropriate at this time given poor prognosis  -Hepatitis panel and HIV negative  -Continue IV Protonix 40 mg over concern for GI bleeding  -Continue DVT ppx for now, but low threshold to stop if acute Hgb drop or any episode of hematemesis  -Tylenol 975 mg q6 PRN for mild pain, Tramadol 50 mg q6 PRN for moderate pain, Oxy 5 mg q6 PRN for severe pain, and Morphine 4mg q3 PRN for breakthrough pain  -Zofran and Tigan PRN for nausea   -Clear liquid diet continue as tolerated with ensures    Nausea and vomiting-resolved as of 1/10/2023  Assessment & Plan  Denies nausea and vomiting today  Plan:  -Zofran 4 mg q6h PRN  -Tigan 200 mg q6h PRN     Iron deficiency-resolved as of 1/8/2023  Assessment & Plan  · 2/2 to gi malignancy  · Continue with Venofer 200 mg daily     [address]     H&P per Dr Denisha Gutierrez: "Rebecca Fierro is a 43 yo M with no significant PMH who presented on [facility] ED on 01/04 with 15 day hx of RUQ sharp, constant and non-radiating abdominal pain  Pain was worsened with movement  Endorsed post-prandial pain, particularly with spicy foods  Reported mild nausea  Initially did not have any emesis, but did have one episode of coffee-ground emesis at [facility]  Reports weight loss over last few months  Denies any dysphagia, but does report nausea when attempting to swallow  Denied any fevers, chills, chest pain, dyspnea, diarrhea, melena, hematochezia, or greasy stool  Patient resides in [place], [place] and is currently visiting his brother  Brother has been involved in his care so far  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  Denies tobacco, alcohol, or illicit drug use  Denies any family hx of gastric or any other malignancy       In ED, CTAP revealed a 6 4 cm x 4 8 cm mass in gastric antrum, multiple hypodense lesions scattered throughout liver, ventral epigastric omental and lesser sac soft tissue nodules and multiple pulmonary nodules  He is being transferred for further workup of gastric mass with metastatic disease  On presentation, T 97 9, HR 59, /74, and SpO2 95%  Lab-work significant for AST 48, ALT 40, Alk Phos 405, Hgb 7 9 (MCV 73), Plt 407, Iron 20, TIBC 441, and Iron Sat 5 "     He was evaluated by medical oncology inpatient and gastric malignancy suspected with stage IV disease and metastasis to abdominal cavity, omentum, liver, and lungs  Microcystic anemia suspected to be from chronic GI loss NIRAV and anemia of chronic disease  Oncology to reevaluate following biopsy results and possible genetic testing due to presence of gastric cancer at young age  Patient underwent EGD/EUS biopsy on 1/9 with GI revealing large gastric mass mainly involving at the antrum, with biopsy positive for gastric adenocarcinoma  Extensive lymphadenopathy was seen in celiac region, lymph node FNA pending  Liver lesion also biopsied and was unrevealing  There were no overt signs of bleeding  Patient seen and examined at bedside on day of discharge  Patient states his abdominal pain has improved and rates pain 1 5/10  He denies N/V/D, chest pain, SOB, dizziness, dyspnea, melena, and hematochezia  Patient states he would like to return to his home  Physical exam was unremarkable and patient in stable condition  He encourage to follow-up/establish care with PCP [facility]  Per GI, it is recommended patient follow-up with medical oncology  outpatient  Lymph node FNA still pending at time of discharge  If FNA is unrevealing, patient needs targeted liver biopsy to confirm metastasis  Patient was discharged with Zofran, Flexeril, and Gabapentin for symptom management  DISCHARGE INFORMATION     Physical Exam  Vitals and nursing note reviewed  Constitutional:       General: He is not in acute distress  Appearance: Normal appearance   He is not ill-appearing or diaphoretic  HENT:      Head: Normocephalic and atraumatic  Nose: No congestion  Mouth/Throat:      Pharynx: Oropharynx is clear  Eyes:      General: No scleral icterus  Conjunctiva/sclera: Conjunctivae normal    Cardiovascular:      Rate and Rhythm: Normal rate and regular rhythm  Pulses: Normal pulses  Heart sounds: Normal heart sounds  No murmur heard  Pulmonary:      Effort: Pulmonary effort is normal  No respiratory distress  Breath sounds: Normal breath sounds  No wheezing  Chest:      Chest wall: No tenderness  Abdominal:      General: Abdomen is flat  Bowel sounds are normal  There is no distension  Palpations: Abdomen is soft  Musculoskeletal:         General: Normal range of motion  Cervical back: Normal range of motion  Right lower leg: No edema  Left lower leg: No edema  Skin:     General: Skin is warm  Capillary Refill: Capillary refill takes less than 2 seconds  Coloration: Skin is not jaundiced  Neurological:      General: No focal deficit present  Mental Status: He is alert and oriented to person, place, and time  Mental status is at baseline  Psychiatric:         Mood and Affect: Mood is depressed  Behavior: Behavior normal          Thought Content: Thought content normal          Judgment: Judgment normal        PCP at Discharge: No PCP on file, will refer to [facility] [address]    Admitting Provider: Natalya Diaz DO  Admission Date: 1/6/2023    Discharge Provider: Marnell Lesches, MD   Discharge Date: 1/10/23    Discharge Disposition: Discharged/transferred to a Facility that Provides detention or Supportive Care  Discharge Condition: stable  Discharge with Lines: no    Discharge Diet: regular diet  Activity Restrictions: none   Test Results Pending at Discharge:   Liver biopsy was unremarkable  FNA still pending at time of discharge   If FNA non revealing, then patient needs targeted liver biopsy to confirm metastasis    Discharge Diagnoses:   Principal Problem:    Gastric mass  Active Problems:    Iron deficiency anemia    Metastatic disease (HCC)    Gastric cancer (HCC)  Resolved Problems:    Nausea and vomiting    Consulting Providers: Gastroenterology, Interventional radiology     Diagnostic & Therapeutic Procedures Performed:  Endoscopic ultrasonography, GI (Upper)    Result Date: 1/9/2023  Impression: Large gastric mass mainly at the antrum concerning for malignancy, cold forceps biopsies obtained During EUS there was extensive lymphadenopathy at the celiac region, one LN was biopsied, single biopsy obtained due to scope malfunction, core of tissue obtained Liver lesion concerning for metastasis seen at the L lobe, single biopsy obtained due to scope malfunction, core of tissue obtained RECOMMENDATION:  Await pathology results Can have clears today   Poonam Champion MD     Code Status: Level 1 - Full Code  Advance Directive & Living Will: <no information>  Power of :    POLST:      Medications: There are no discharge medications for this patient  There are no discharge medications for this patient  There are no discharge medications for this patient  Allergies:  No Known Allergies    FOLLOW-UP     PCP Outpatient Follow-up:  Referred for TCM follow-up at [facility]    Consulting Providers Follow-up:  Follow-up outpatient with GI    Active Issues Requiring Follow-up:   Metastatic gastric adenocarcinoma    Discharge Statement:   I spent 30 minutes minutes discharging the patient  This time was spent on the day of discharge  I had direct contact with the patient on the day of discharge  Additional documentation is required if more than 30 minutes were spent on discharge  Portions of the record may have been created with voice recognition software    Occasional wrong word or "sound a like" substitutions may have occurred due to the inherent limitations of voice recognition software    Read the chart carefully and recognize, using context, where substitutions have occurred     ==  Mel Costa, [facility]  Internal Medicine Resident PGY-1

## 2023-01-10 NOTE — CASE MANAGEMENT
Case Management Discharge Planning Note    Patient name Les Henning  Location [facility] 919/[facility] 182-59 MRN [id number]  : 1985 Date 1/10/2023       Current Admission Date: 2023  Current Admission Diagnosis:Gastric mass   Patient Active Problem List    Diagnosis Date Noted   • Gastric cancer ([facility] ) 01/10/2023   • Iron deficiency anemia 2023   • Gastric mass 2023   • Metastatic disease ([address] [address] ) 2023      LOS (days): 4  Geometric Mean LOS (GMLOS) (days):   Days to GMLOS:     OBJECTIVE:  Risk of Unplanned Readmission Score: 11 4         Current admission status: Inpatient   Preferred Pharmacy:   [facility] [place] [address] [facility] [address] [address] [address]  Phone: [phone] Fax: [phone]    Primary Care Provider: No primary care provider on file  Primary Insurance: JAMESON MEDEIROS PENDING  Secondary Insurance:     DISCHARGE DETAILS:    Additional Comments: Per provider, pt is medically clear for d/c with no CM needs

## 2023-01-10 NOTE — PROGRESS NOTES
Progress Note- Ramon Ponce 42 y o  male MRN: [id number]    [facility] Encounter: [id number]      Assessment and Plan:  Ramon Ponce is a 42 y o  old male with no significant past medical history presenting with abdominal pain, GI consulted for NIRAV, gastric mass and liver mets noted on imaging       Metastatic gastric adenocarcinoma    · Patient underwent EGD/EUS yesterday which revealed large gastric mass mainly involving at the antrum, biopsies positive for gastric adenocarcinoma  · Extensive lymphadenopathy seen in the celiac region, lymph node FNA is pending    If FNA is unrevealing he will need targeted liver biopsy if needed by oncology for confirming metastasis  · Liver lesion biopsied, however the biopsy results were unrevealing  · Further management as per medical oncology       Iron deficiency anemia    · In the setting of gastric adenocarcinoma as discussed above   · No overt signs of bleeding      Disposition: Patient can be discharged from GI standpoint  ______________________________________________________________________    Subjective:     Patient denies any abdominal pain    Medication Administration - last 24 hours from 01/09/2023 1041 to 01/10/2023 1041       Date/Time Order Dose Route Action Action by     01/10/2023 1010 EST polyethylene glycol (MIRALAX) packet 17 g 17 g Oral Given Aleman Meter, VIRIDIANA     01/10/2023 1010 EST enoxaparin (LOVENOX) subcutaneous injection 40 mg 40 mg Subcutaneous Given Aleman Meter, VIRIDIANA     01/10/2023 0900 EST enoxaparin (LOVENOX) subcutaneous injection 40 mg 0 mg Subcutaneous Return to [place], [place]     01/10/2023 0109 EST pantoprazole (PROTONIX) EC tablet 40 mg 40 mg Oral Given Cathie Noriega, VIRIDIANA     01/09/2023 1550 EST pantoprazole (PROTONIX) EC tablet 40 mg 40 mg Oral Given Larena Lombard, RN     01/10/2023 0108 EST Gadobutrol injection (SINGLE-DOSE) SOLN 7 mL 7 mL Intravenous Given Nicole Parks Objective:     Vitals: Blood pressure 114/63, pulse 58, temperature 98 2 °F (36 8 °C), resp  rate 18, height 5' 6" (1 676 m), weight 79 4 kg (175 lb), SpO2 96 %  ,Body mass index is 28 25 kg/m²  Intake/Output Summary (Last 24 hours) at 1/10/2023 1041  Last data filed at 1/9/2023 1132  Gross per 24 hour   Intake 500 ml   Output --   Net 500 ml       Physical Exam:   General Appearance: Awake and alert, in no acute distress  Abdomen: Soft, non-tender, non-distended; bowel sounds normal; no masses or no organomegaly    Invasive Devices     Peripheral Intravenous Line  Duration           Peripheral IV 01/10/23 Dorsal (posterior); Left Wrist <1 day                Lab Results:  Admission on 01/06/2023   Component Date Value   • Hepatitis B Surface Ag 01/06/2023 Non-reactive    • Hepatitis C Ab 01/06/2023 Non-reactive    • Hep B C IgM 01/06/2023 Non-reactive    • Hep B Core Total Ab 01/06/2023 Non-reactive    • Rapid HIV 1 AND 2 01/06/2023 Non-Reactive    • HIV-1 P24 Ag Screen 01/06/2023 Non-Reactive    • WBC 01/07/2023 7 63    • RBC 01/07/2023 3 50 (L)    • Hemoglobin 01/07/2023 8 2 (L)    • Hematocrit 01/07/2023 26 2 (L)    • MCV 01/07/2023 75 (L)    • MCH 01/07/2023 23 4 (L)    • MCHC 01/07/2023 31 3 (L)    • RDW 01/07/2023 14 1    • MPV 01/07/2023 8 9    • Platelets 46/28/1661 456 (H)    • nRBC 01/07/2023 0    • Neutrophils Relative 01/07/2023 76 (H)    • Immat GRANS % 01/07/2023 0    • Lymphocytes Relative 01/07/2023 13 (L)    • Monocytes Relative 01/07/2023 9    • Eosinophils Relative 01/07/2023 1    • Basophils Relative 01/07/2023 1    • Neutrophils Absolute 01/07/2023 5 77    • Immature Grans Absolute 01/07/2023 0 02    • Lymphocytes Absolute 01/07/2023 1 00    • Monocytes Absolute 01/07/2023 0 70    • Eosinophils Absolute 01/07/2023 0 10    • Basophils Absolute 01/07/2023 0 04    • Sodium 01/07/2023 138    • Potassium 01/07/2023 3 9    • Chloride 01/07/2023 104    • CO2 01/07/2023 26    • ANION GAP 01/07/2023 8    • BUN 01/07/2023 14    • Creatinine 01/07/2023 0 80    • Glucose 01/07/2023 84    • Calcium 01/07/2023 8 7    • eGFR 01/07/2023 112    • Total Bilirubin 01/07/2023 0 41    • Bilirubin, Direct 01/07/2023 0 11    • Alkaline Phosphatase 01/07/2023 430 (H)    • AST 01/07/2023 129 (H)    • ALT 01/07/2023 43    • Total Protein 01/07/2023 7 2    • Albumin 01/07/2023 2 5 (L)    • WBC 01/08/2023 7 28    • RBC 01/08/2023 3 58 (L)    • Hemoglobin 01/08/2023 8 2 (L)    • Hematocrit 01/08/2023 27 3 (L)    • MCV 01/08/2023 76 (L)    • MCH 01/08/2023 22 9 (L)    • MCHC 01/08/2023 30 0 (L)    • RDW 01/08/2023 14 1    • Platelets 85/90/2922 460 (H)    • MPV 01/08/2023 9 4    • Sodium 01/08/2023 139    • Potassium 01/08/2023 4 1    • Chloride 01/08/2023 104    • CO2 01/08/2023 28    • ANION GAP 01/08/2023 7    • BUN 01/08/2023 13    • Creatinine 01/08/2023 0 74    • Glucose 01/08/2023 81    • Calcium 01/08/2023 8 5    • Corrected Calcium 01/08/2023 9 7    • AST 01/08/2023 142 (H)    • ALT 01/08/2023 41    • Alkaline Phosphatase 01/08/2023 389 (H)    • Total Protein 01/08/2023 6 8    • Albumin 01/08/2023 2 5 (L)    • Total Bilirubin 01/08/2023 0 69    • eGFR 01/08/2023 116    • WBC 01/09/2023 7 74    • RBC 01/09/2023 3 60 (L)    • Hemoglobin 01/09/2023 8 4 (L)    • Hematocrit 01/09/2023 26 6 (L)    • MCV 01/09/2023 74 (L)    • MCH 01/09/2023 23 3 (L)    • MCHC 01/09/2023 31 6    • RDW 01/09/2023 14 1    • MPV 01/09/2023 9 1    • Platelets 30/76/5036 457 (H)    • nRBC 01/09/2023 0    • Neutrophils Relative 01/09/2023 69    • Immat GRANS % 01/09/2023 0    • Lymphocytes Relative 01/09/2023 17    • Monocytes Relative 01/09/2023 10    • Eosinophils Relative 01/09/2023 3    • Basophils Relative 01/09/2023 1    • Neutrophils Absolute 01/09/2023 5 32    • Immature Grans Absolute 01/09/2023 0 02    • Lymphocytes Absolute 01/09/2023 1 34    • Monocytes Absolute 01/09/2023 0 77    • Eosinophils Absolute 01/09/2023 0 25    • Basophils Absolute 01/09/2023 0 04    • Sodium 01/09/2023 139    • Potassium 01/09/2023 3 7    • Chloride 01/09/2023 105    • CO2 01/09/2023 27    • ANION GAP 01/09/2023 7    • BUN 01/09/2023 10    • Creatinine 01/09/2023 0 73    • Glucose 01/09/2023 87    • Calcium 01/09/2023 8 9    • eGFR 01/09/2023 116    • Case Report 01/09/2023                      Value:Surgical Pathology Report                         Case: [id number]                                   Authorizing Provider:  Gus Warner DO      Collected:           01/09/2023 1037              Ordering Location:     [address] [place] [address]      Received:            01/09/2023 [address]                                                              Pathologist:           Jostin Tabares MD                                                                 Specimen:    Stomach, gastric mass                                                                     • Final Diagnosis 01/09/2023                      Value: This result contains rich text formatting which cannot be displayed here  • Preliminary Diagnosis 01/09/2023                      Value: This result contains rich text formatting which cannot be displayed here  • Note 01/09/2023                      Value: This result contains rich text formatting which cannot be displayed here  • Additional Information 01/09/2023                      Value: This result contains rich text formatting which cannot be displayed here  • Gross Description 01/09/2023                      Value: This result contains rich text formatting which cannot be displayed here     • Case Report 01/09/2023                      Value:Surgical Pathology Report                         Case: [id number]                                   Authorizing Provider:  Carol Huston MD  Collected:           01/09/2023 1543              Ordering Location:     [address] [place] [address]      Received: 01/09/2023 [address] [place] [address]                                                              Pathologist:           Liliana Alaniz MD                                                                 Specimen:    Liver                                                                                     • Final Diagnosis 01/09/2023                      Value: This result contains rich text formatting which cannot be displayed here  • Additional Information 01/09/2023                      Value: This result contains rich text formatting which cannot be displayed here  • Gross Description 01/09/2023                      Value: This result contains rich text formatting which cannot be displayed here  • Clinical Information 01/09/2023                      Value: There was a 4 cm hypoechoic mass seen at the stomach, previously biopsied during EGD  The left lobe of the liver was visualized and a 3 cm slightly hyperechoic lesion with a rim suspicious for metastasis was seen   Fine needle biopsy was performed from the lesion   • WBC 01/10/2023 8 07    • RBC 01/10/2023 3 64 (L)    • Hemoglobin 01/10/2023 8 5 (L)    • Hematocrit 01/10/2023 27 5 (L)    • MCV 01/10/2023 76 (L)    • MCH 01/10/2023 23 4 (L)    • MCHC 01/10/2023 30 9 (L)    • RDW 01/10/2023 14 6    • MPV 01/10/2023 9 7    • Platelets 73/86/2157 453 (H)    • nRBC 01/10/2023 0    • Neutrophils Relative 01/10/2023 69    • Immat GRANS % 01/10/2023 1    • Lymphocytes Relative 01/10/2023 17    • Monocytes Relative 01/10/2023 11    • Eosinophils Relative 01/10/2023 2    • Basophils Relative 01/10/2023 0    • Neutrophils Absolute 01/10/2023 5 57    • Immature Grans Absolute 01/10/2023 0 04    • Lymphocytes Absolute 01/10/2023 1 38    • Monocytes Absolute 01/10/2023 0 86    • Eosinophils Absolute 01/10/2023 0 19    • Basophils Absolute 01/10/2023 0 03    • Sodium 01/10/2023 140    • Potassium 01/10/2023 3 8    • Chloride 01/10/2023 108    • CO2 01/10/2023 26    • ANION GAP 01/10/2023 6    • BUN 01/10/2023 14    • Creatinine 01/10/2023 0 88    • Glucose 01/10/2023 91    • Calcium 01/10/2023 8 6    • Corrected Calcium 01/10/2023 9 8    • AST 01/10/2023 221 (H)    • ALT 01/10/2023 80 (H)    • Alkaline Phosphatase 01/10/2023 510 (H)    • Total Protein 01/10/2023 7 1    • Albumin 01/10/2023 2 5 (L)    • Total Bilirubin 01/10/2023 0 38    • eGFR 01/10/2023 108        Imaging Studies: I have personally reviewed pertinent imaging studies

## 2023-01-10 NOTE — PLAN OF CARE
Problem: PAIN - ADULT  Goal: Verbalizes/displays adequate comfort level or baseline comfort level  Description: Interventions:  - Encourage patient to monitor pain and request assistance  - Assess pain using appropriate pain scale  - Administer analgesics based on type and severity of pain and evaluate response  - Implement non-pharmacological measures as appropriate and evaluate response  - Consider cultural and social influences on pain and pain management  - Notify physician/advanced practitioner if interventions unsuccessful or patient reports new pain  Outcome: Progressing     Problem: INFECTION - ADULT  Goal: Absence or prevention of progression during hospitalization  Description: INTERVENTIONS:  - Assess and monitor for signs and symptoms of infection  - Monitor lab/diagnostic results  - Monitor all insertion sites, i e  indwelling lines, tubes, and drains  - Monitor endotracheal if appropriate and nasal secretions for changes in amount and color  - [place] appropriate cooling/warming therapies per order  - Administer medications as ordered  - Instruct and encourage patient and family to use good hand hygiene technique  - Identify and instruct in appropriate isolation precautions for identified infection/condition  Outcome: Progressing  Goal: Absence of fever/infection during neutropenic period  Description: INTERVENTIONS:  - Monitor WBC    Outcome: Progressing     Problem: SAFETY ADULT  Goal: Patient will remain free of falls  Description: INTERVENTIONS:  - Educate patient/family on patient safety including physical limitations  - Instruct patient to call for assistance with activity   - Consult OT/PT to assist with strengthening/mobility   - Keep Call bell within reach  - Keep bed low and locked with side rails adjusted as appropriate  - Keep care items and personal belongings within reach  - Initiate and maintain comfort rounds  Outcome: Progressing     Problem: Knowledge Deficit  Goal: Patient/family/caregiver demonstrates understanding of disease process, treatment plan, medications, and discharge instructions  Description: Complete learning assessment and assess knowledge base    Interventions:  - Provide teaching at level of understanding  - Provide teaching via preferred learning methods  Outcome: Progressing

## 2023-01-11 ENCOUNTER — TELEPHONE (OUTPATIENT)
Dept: HEMATOLOGY ONCOLOGY | Facility: CLINIC | Age: 38
End: 2023-01-11

## 2023-01-11 ENCOUNTER — TELEPHONE (OUTPATIENT)
Dept: INTERNAL MEDICINE CLINIC | Facility: CLINIC | Age: 38
End: 2023-01-11

## 2023-01-11 NOTE — TELEPHONE ENCOUNTER
Made attempt to schedule a hospital follow up appointment, patient was seen in patient by Dr Lyle Frias  No answer and a voicemail was left with the [facility] number to call back to schedule a appointment

## 2023-01-11 NOTE — TELEPHONE ENCOUNTER
----- Message from Spencer Fagan MD sent at 1/10/2023  7:51 AM EST -----  Regarding: Need for TCM  Good morning,     Please schedule patient for TCM visit, being discharged on 1/10/23      Thank you,  Ángel Goldman MD

## 2023-01-12 ENCOUNTER — TELEPHONE (OUTPATIENT)
Dept: HEMATOLOGY ONCOLOGY | Facility: CLINIC | Age: 38
End: 2023-01-12

## 2023-01-12 NOTE — TELEPHONE ENCOUNTER
Soft Intake Form   Patient Details   Arnold Laguna     1985     Reason For Appointment   Who is Calling? Patient   If not patient, Name? W/  [id number]    DID YOU CONFIRM INSURANCE WITH PATIENT? The patient currently has no Insurance    Who is the Referring Doctor? Hospital follow up    What is the diagnosis? gastric malignancy     Has this diagnosis been confirmed by a biopsy/surgery? If yes, what is the date it was done? Yes  1/9/2023   Biopsy done at [place] [address]? If not, where was it done? yes   Was imaging done, and was it done at [address] [place] [address]? If not, where was it done? yes   Have you been seen by another Oncologist?  If so, who and where (name of facility, city and state) no   For 2nd Opinions Only: Are you currently undergoing treatment, or are you scheduled to start treatment? If yes, name of facility, city and state     For "History Of" only: Have you completed treatment? Have you had Genetic Testing done in the past?  If so, advise to bring test results to their visit no   Record Gathering Information   Did you advise to have records faxed to [phone]? no   Did you advise to have disks sent to the proper address with imaging? ("History of" Patients)  5 years of imaging for breast patients-Mammos, US etc no   Scheduling Information   Did you send new patient paperwork? Email or mail? Mail   What is the best call back number? (If the [facility] is calling, please use their number) [phone]   Miscellaneous Information    In-basket message to schedule hospital follow up with Dr Lyle Frias   1/30 940 am Dr Deedee Head patient request [facility] office

## 2023-01-13 ENCOUNTER — DOCUMENTATION (OUTPATIENT)
Dept: HEMATOLOGY ONCOLOGY | Facility: CLINIC | Age: 38
End: 2023-01-13

## 2023-01-13 DIAGNOSIS — C16.3 MALIGNANT NEOPLASM OF PYLORIC ANTRUM (HCC): Primary | ICD-10-CM

## 2023-01-13 NOTE — PROGRESS NOTES
Intake received, chart reviewed with [facility] for initial intake and pathway workup evaluation    Biopsy confirmed, PET CT ordered, pt scheduled to see Dr Janie Roy on 1/30

## 2023-01-16 ENCOUNTER — PATIENT OUTREACH (OUTPATIENT)
Dept: HEMATOLOGY ONCOLOGY | Facility: CLINIC | Age: 38
End: 2023-01-16

## 2023-01-17 ENCOUNTER — DOCUMENTATION (OUTPATIENT)
Dept: HEMATOLOGY ONCOLOGY | Facility: CLINIC | Age: 38
End: 2023-01-17

## 2023-01-17 DIAGNOSIS — C16.3 MALIGNANT NEOPLASM OF PYLORIC ANTRUM (HCC): Primary | ICD-10-CM

## 2023-01-18 ENCOUNTER — PATIENT OUTREACH (OUTPATIENT)
Dept: HEMATOLOGY ONCOLOGY | Facility: CLINIC | Age: 38
End: 2023-01-18

## 2023-01-18 NOTE — PROGRESS NOTES
Phone outreach to the patient with  ([id number]), I introduced myself and  explained my role  We discussed his recent hospital visit with Srini Brandon and diagnosis  The patient reports since moved to [address] to stay with his brother for support due to having no family or employment in [place]  At present patient states will follow up there and wishes to  canceled all appointment with Mikael Yuan  Nothing further required by navigation

## 2023-02-10 ENCOUNTER — TELEPHONE (OUTPATIENT)
Dept: HEMATOLOGY ONCOLOGY | Facility: CLINIC | Age: 38
End: 2023-02-10

## 2023-02-13 ENCOUNTER — TELEPHONE (OUTPATIENT)
Dept: HEMATOLOGY ONCOLOGY | Facility: CLINIC | Age: 38
End: 2023-02-13

## 2023-02-13 NOTE — TELEPHONE ENCOUNTER
Made a 3rd attempt to schedule a new patient appointment with Hematology oncology and/or Surgical oncology a voicemail was left, the referral has been closed and a letter has been sent to the patient  Referring provider has been notified

## 2023-03-29 ENCOUNTER — TELEPHONE (OUTPATIENT)
Dept: GASTROENTEROLOGY | Facility: CLINIC | Age: 38
End: 2023-03-29